# Patient Record
Sex: FEMALE | Race: BLACK OR AFRICAN AMERICAN | Employment: FULL TIME | URBAN - METROPOLITAN AREA
[De-identification: names, ages, dates, MRNs, and addresses within clinical notes are randomized per-mention and may not be internally consistent; named-entity substitution may affect disease eponyms.]

---

## 2017-12-22 ENCOUNTER — HOSPITAL ENCOUNTER (OUTPATIENT)
Dept: MAMMOGRAPHY | Age: 57
Discharge: HOME | End: 2017-12-22
Payer: COMMERCIAL

## 2017-12-22 ENCOUNTER — HOSPITAL ENCOUNTER (OUTPATIENT)
Age: 57
Discharge: HOME | End: 2017-12-22
Payer: COMMERCIAL

## 2017-12-22 DIAGNOSIS — Z13.820 SCREENING FOR OSTEOPOROSIS: ICD-10-CM

## 2017-12-22 DIAGNOSIS — Z12.31 ENCOUNTER FOR SCREENING MAMMOGRAM FOR BREAST CANCER: ICD-10-CM

## 2017-12-22 PROCEDURE — 77063 BREAST TOMOSYNTHESIS BI: CPT

## 2017-12-22 PROCEDURE — 77080 DXA BONE DENSITY AXIAL: CPT

## 2018-12-29 ENCOUNTER — HOSPITAL ENCOUNTER (OUTPATIENT)
Age: 58
Discharge: HOME | End: 2018-12-31
Payer: COMMERCIAL

## 2018-12-29 ENCOUNTER — HOSPITAL ENCOUNTER (OUTPATIENT)
Dept: MAMMOGRAPHY | Age: 58
Discharge: HOME | End: 2018-12-31
Payer: COMMERCIAL

## 2018-12-29 DIAGNOSIS — M81.0 OSTEOPOROSIS, UNSPECIFIED OSTEOPOROSIS TYPE, UNSPECIFIED PATHOLOGICAL FRACTURE PRESENCE: ICD-10-CM

## 2018-12-29 DIAGNOSIS — Z12.39 BREAST CANCER SCREENING: ICD-10-CM

## 2018-12-29 PROCEDURE — 77063 BREAST TOMOSYNTHESIS BI: CPT

## 2018-12-29 PROCEDURE — 77080 DXA BONE DENSITY AXIAL: CPT

## 2020-12-12 ENCOUNTER — HOSPITAL ENCOUNTER (OUTPATIENT)
Dept: MAMMOGRAPHY | Age: 60
Discharge: HOME | End: 2020-12-14
Payer: COMMERCIAL

## 2020-12-12 PROCEDURE — 77063 BREAST TOMOSYNTHESIS BI: CPT

## 2021-04-09 ENCOUNTER — HOSPITAL ENCOUNTER (OUTPATIENT)
Dept: PREADMISSION TESTING | Age: 61
Discharge: HOME | End: 2021-04-13
Payer: COMMERCIAL

## 2021-04-09 VITALS
SYSTOLIC BLOOD PRESSURE: 158 MMHG | OXYGEN SATURATION: 99 % | HEART RATE: 73 BPM | RESPIRATION RATE: 18 BRPM | WEIGHT: 180.2 LBS | DIASTOLIC BLOOD PRESSURE: 65 MMHG | BODY MASS INDEX: 35.38 KG/M2 | TEMPERATURE: 96 F | HEIGHT: 60 IN

## 2021-04-09 LAB
-: ABNORMAL
ABSOLUTE EOS #: <0.03 K/UL (ref 0–0.44)
ABSOLUTE IMMATURE GRANULOCYTE: 0.03 K/UL (ref 0–0.3)
ABSOLUTE LYMPH #: 1.3 K/UL (ref 1.1–3.7)
ABSOLUTE MONO #: 0.37 K/UL (ref 0.1–1.2)
AMORPHOUS: ABNORMAL
ANION GAP SERPL CALCULATED.3IONS-SCNC: 12 MMOL/L (ref 9–17)
BACTERIA: ABNORMAL
BASOPHILS # BLD: 0 % (ref 0–2)
BASOPHILS ABSOLUTE: 0.03 K/UL (ref 0–0.2)
BILIRUBIN URINE: NEGATIVE
BUN BLDV-MCNC: 19 MG/DL (ref 8–23)
BUN/CREAT BLD: 21 (ref 9–20)
CALCIUM SERPL-MCNC: 9.3 MG/DL (ref 8.6–10.4)
CASTS UA: ABNORMAL /LPF
CHLORIDE BLD-SCNC: 103 MMOL/L (ref 98–107)
CO2: 26 MMOL/L (ref 20–31)
COLOR: YELLOW
COMMENT UA: ABNORMAL
CREAT SERPL-MCNC: 0.91 MG/DL (ref 0.5–0.9)
CRYSTALS, UA: ABNORMAL /HPF
DIFFERENTIAL TYPE: ABNORMAL
EOSINOPHILS RELATIVE PERCENT: 0 % (ref 1–4)
EPITHELIAL CELLS UA: ABNORMAL /HPF (ref 0–5)
GFR AFRICAN AMERICAN: >60 ML/MIN
GFR NON-AFRICAN AMERICAN: >60 ML/MIN
GFR SERPL CREATININE-BSD FRML MDRD: ABNORMAL ML/MIN/{1.73_M2}
GFR SERPL CREATININE-BSD FRML MDRD: ABNORMAL ML/MIN/{1.73_M2}
GLUCOSE BLD-MCNC: 178 MG/DL (ref 70–99)
GLUCOSE URINE: ABNORMAL
HCT VFR BLD CALC: 38.1 % (ref 36.3–47.1)
HEMOGLOBIN: 12 G/DL (ref 11.9–15.1)
IMMATURE GRANULOCYTES: 0 %
INR BLD: 0.9
KETONES, URINE: NEGATIVE
LEUKOCYTE ESTERASE, URINE: ABNORMAL
LYMPHOCYTES # BLD: 17 % (ref 24–43)
MCH RBC QN AUTO: 30 PG (ref 25.2–33.5)
MCHC RBC AUTO-ENTMCNC: 31.5 G/DL (ref 28.4–34.8)
MCV RBC AUTO: 95.3 FL (ref 82.6–102.9)
MONOCYTES # BLD: 5 % (ref 3–12)
MUCUS: ABNORMAL
NITRITE, URINE: NEGATIVE
NRBC AUTOMATED: 0 PER 100 WBC
OTHER OBSERVATIONS UA: ABNORMAL
PARTIAL THROMBOPLASTIN TIME: 23.9 SEC (ref 23.9–33.8)
PDW BLD-RTO: 13.5 % (ref 11.8–14.4)
PH UA: 6 (ref 5–8)
PLATELET # BLD: 297 K/UL (ref 138–453)
PLATELET ESTIMATE: ABNORMAL
PMV BLD AUTO: 9.8 FL (ref 8.1–13.5)
POTASSIUM SERPL-SCNC: 4.3 MMOL/L (ref 3.7–5.3)
PROTEIN UA: ABNORMAL
PROTHROMBIN TIME: 11.9 SEC (ref 11.5–14.2)
RBC # BLD: 4 M/UL (ref 3.95–5.11)
RBC # BLD: ABNORMAL 10*6/UL
RBC UA: ABNORMAL /HPF (ref 0–2)
RENAL EPITHELIAL, UA: ABNORMAL /HPF
SEG NEUTROPHILS: 78 % (ref 36–65)
SEGMENTED NEUTROPHILS ABSOLUTE COUNT: 5.9 K/UL (ref 1.5–8.1)
SODIUM BLD-SCNC: 141 MMOL/L (ref 135–144)
SPECIFIC GRAVITY UA: 1.04 (ref 1–1.03)
TRICHOMONAS: ABNORMAL
TURBIDITY: ABNORMAL
URINE HGB: ABNORMAL
UROBILINOGEN, URINE: NORMAL
WBC # BLD: 7.6 K/UL (ref 3.5–11.3)
WBC # BLD: ABNORMAL 10*3/UL
WBC UA: ABNORMAL /HPF (ref 0–5)
YEAST: ABNORMAL

## 2021-04-09 PROCEDURE — 87641 MR-STAPH DNA AMP PROBE: CPT

## 2021-04-09 PROCEDURE — 85610 PROTHROMBIN TIME: CPT

## 2021-04-09 PROCEDURE — 93005 ELECTROCARDIOGRAM TRACING: CPT | Performed by: ORTHOPAEDIC SURGERY

## 2021-04-09 PROCEDURE — 81001 URINALYSIS AUTO W/SCOPE: CPT

## 2021-04-09 PROCEDURE — 85025 COMPLETE CBC W/AUTO DIFF WBC: CPT

## 2021-04-09 PROCEDURE — 80048 BASIC METABOLIC PNL TOTAL CA: CPT

## 2021-04-09 PROCEDURE — 85730 THROMBOPLASTIN TIME PARTIAL: CPT

## 2021-04-09 PROCEDURE — 36415 COLL VENOUS BLD VENIPUNCTURE: CPT

## 2021-04-09 RX ORDER — CLINDAMYCIN PHOSPHATE 900 MG/50ML
900 INJECTION INTRAVENOUS ONCE
Status: CANCELLED | OUTPATIENT
Start: 2021-04-26

## 2021-04-09 RX ORDER — CARVEDILOL 3.12 MG/1
TABLET ORAL
COMMUNITY

## 2021-04-09 RX ORDER — OXYCODONE HYDROCHLORIDE AND ACETAMINOPHEN 5; 325 MG/1; MG/1
2 TABLET ORAL EVERY 6 HOURS PRN
Status: ON HOLD | COMMUNITY
End: 2021-04-27 | Stop reason: HOSPADM

## 2021-04-09 RX ORDER — NYSTATIN 100000 [USP'U]/G
POWDER TOPICAL 4 TIMES DAILY
COMMUNITY
Start: 2021-03-24

## 2021-04-09 RX ORDER — TIZANIDINE HYDROCHLORIDE 2 MG/1
CAPSULE, GELATIN COATED ORAL
Status: ON HOLD | COMMUNITY
End: 2021-04-27 | Stop reason: HOSPADM

## 2021-04-09 RX ORDER — MULTIVIT WITH MINERALS/LUTEIN
250 TABLET ORAL DAILY
COMMUNITY

## 2021-04-09 RX ORDER — PREDNISONE 5 MG/1
TABLET ORAL
Status: ON HOLD | COMMUNITY
End: 2021-04-27 | Stop reason: HOSPADM

## 2021-04-09 RX ORDER — LOSARTAN POTASSIUM 50 MG/1
TABLET ORAL
COMMUNITY

## 2021-04-09 RX ORDER — IBUPROFEN 600 MG/1
600 TABLET ORAL EVERY 6 HOURS PRN
Status: ON HOLD | COMMUNITY
End: 2021-04-27 | Stop reason: HOSPADM

## 2021-04-09 RX ORDER — CALCIUM CARBONATE 500(1250)
500 TABLET ORAL DAILY
COMMUNITY

## 2021-04-09 RX ORDER — INSULIN GLARGINE 100 [IU]/ML
25 INJECTION, SOLUTION SUBCUTANEOUS
COMMUNITY

## 2021-04-09 RX ORDER — INSULIN LISPRO 100 [IU]/ML
6 INJECTION, SOLUTION INTRAVENOUS; SUBCUTANEOUS PRN
COMMUNITY

## 2021-04-09 RX ORDER — GABAPENTIN 100 MG/1
CAPSULE ORAL
COMMUNITY

## 2021-04-09 RX ORDER — FUROSEMIDE 40 MG/1
40 TABLET ORAL 2 TIMES DAILY
COMMUNITY

## 2021-04-09 RX ORDER — ZINC GLUCONATE 50 MG
50 TABLET ORAL DAILY
COMMUNITY

## 2021-04-09 RX ORDER — MAGNESIUM OXIDE 400 MG/1
400 TABLET ORAL DAILY
COMMUNITY

## 2021-04-09 RX ORDER — VIT C/B6/B5/MAGNESIUM/HERB 173 50-5-6-5MG
CAPSULE ORAL
Status: ON HOLD | COMMUNITY
End: 2021-04-27 | Stop reason: HOSPADM

## 2021-04-09 RX ORDER — M-VIT,TX,IRON,MINS/CALC/FOLIC 27MG-0.4MG
1 TABLET ORAL DAILY
COMMUNITY

## 2021-04-09 RX ORDER — CHOLECALCIFEROL (VITAMIN D3) 100000/G
POWDER (GRAM) MISCELLANEOUS
COMMUNITY

## 2021-04-09 ASSESSMENT — PAIN DESCRIPTION - ORIENTATION: ORIENTATION: RIGHT;LEFT

## 2021-04-09 NOTE — PRE-PROCEDURE INSTRUCTIONS
[facility] ON Monday, April 26 at 1130 AM    Once you enter the hospital lobby, take the elevators to the second floor. Check-In is at the surgery registration desk. Continue to take your home medications as you normally do up to and including the night before surgery with the exception of any blood thinning medications. Please stop any blood thinning medications as directed by your surgeon or prescribing physician. Failure to stop certain medications may interfere with your scheduled surgery. These may include:  Aspirin, Warfarin (Coumadin), Clopidogrel (Plavix), Ibuprofen (Motrin, Advil), Naproxen (Aleve), Meloxicam (Mobic), Celecoxib (Celebrex), Eliquis, Pradaxa, Xarelto, Effient, Fish Oil, Herbal supplements. Stop taking supplements and advil 5-7 days before surgery     If you are diabetic, do not take any of your diabetic medications by mouth the morning of surgery. If you are taking insulin contact the doctor that manages your diabetes for instructions about any changes to your insulin dosages the day before surgery. Do not inject insulin or other injectable diabetic medications the morning of surgery unless otherwise instructed by the doctor who manages your diabetes. Please take the following medication(s) the day of surgery with a small sip of water:  Carvedilol,       PREPARING FOR YOUR SURGERY:     Before surgery, you can play an important role in your own health. Because skin is not sterile, we need to be sure that your skin is as free of germs as possible before surgery by carefully washing before surgery. Preparing or prepping skin before surgery can reduce the risk of a surgical site infection.   Do not shave the area of your body where your surgery will be performed unless you received specific permission from your physician.     You will need to shower at home the night before surgery and the morning of surgery with a special soap called chlorhexidine gluconate (CHG*). *Not to be used by people allergic to Chlorhexidine Gluconate (CHG). Following these instructions will help you be sure that your skin is clean before surgery. Instructions on cleaning your skin before surgery: The night before your surgery:      You will need to shower with warm water (not hot) and the CHG soap.  Use a clean wash cloth and a clean towel. Have clean clothes available to put on after the shower.   First wash your hair with regular shampoo. Rinse your hair and body thoroughly to remove the shampoo.  Wash your face and genital area (private parts) with your regular soap or water only. Thoroughly rinse your body with warm water from the neck down.  Turn water off to prevent rinsing the soap off too soon.  With a clean wet washcloth and half of the CHG soap in the bottle, lather your entire body from the neck down. Do not use CHG soap near your eyes or ears to avoid injury to those areas.  Wash thoroughly, paying special attention to the area where your surgery will be performed.  Wash your body gently for five (5) minutes. Avoid scrubbing your skin too hard.  Turn the water back on and rinse your body thoroughly.  Pat yourself dry with a clean, soft towel. Do not apply lotion, cream or powder.  Dress with clean freshly washed clothes. The morning of surgery:     Repeat shower following steps above - using remaining half of CHG soap in bottle. Patient Instructions:    Angelia Eaton If you are having any type of anesthesia you are to have nothing to eat or drink after midnight the night before your surgery. This includes gum, hard candy, mints, water or smoking or chewing tobacco.  The only exception to this is a small sip of water to take with any morning dose of heart, blood pressure, or seizure medications. No alcoholic beverages for 24 hours prior to surgery.  Brush your teeth but do not swallow water.      Bring your eye glasses and case with you. No contacts are to be worn the day of surgery. You also may bring your hearing aids. Most surgical procedures involving anesthesia will require that you remove your dentures prior to surgery.  If you are on C-PAP or Bi-PAP at home and plan on staying in the hospital overnight for your surgery please bring the machine with you. · Do not wear any jewelry or body piercings day of surgery. Also, NO lotion, perfume or deodorant to be used the day of surgery. No nail polish on the operative extremity (arm/leg surgeries)    · If you are staying overnight with us, please bring a small bag of necessary personal items.  Please wear loose, comfortable clothing. If you are potentially going to have a cast or brace bring clothing that will fit over them.  In case of illness - If you have cold or flu like symptoms (high fever, runny nose, sore throat, cough, etc.) rash, nausea, vomiting, loose stools, and/or recent contact with someone who has a contagious disease (chicken pox, measles, etc.) Please call your doctor before coming to the hospital.         Day of Surgery/Procedure:    As a patient at [facility] - INPATIENT you can expect quality medical and nursing care that is centered on your individual needs. Our goal is to make your surgical experience as comfortable as possible    . Transportation After Your Surgery/Procedure: You will need a friend or family member to drive you home after your procedure. Your  must be 25years of age or older and able to sign off on your discharge instructions. A taxi cab or any other form of public transportation is not acceptable. Your friend or family member must stay at the hospital throughout your procedure.     Someone must remain with you for the first 24 hours after your surgery if you receive anesthesia or medication. If you do not have someone to stay with you, your procedure may be cancelled.       If you have any other questions regarding your procedure or the day of surgery, please call [phone]      _________________________  ____________________________  Signature (Patient)              Signature (Provider)               Date

## 2021-04-10 LAB
MRSA, DNA, NASAL: NORMAL
SPECIMEN DESCRIPTION: NORMAL

## 2021-04-11 LAB
EKG ATRIAL RATE: 73 BPM
EKG P AXIS: 122 DEGREES
EKG P-R INTERVAL: 136 MS
EKG Q-T INTERVAL: 364 MS
EKG QRS DURATION: 82 MS
EKG QTC CALCULATION (BAZETT): 401 MS
EKG R AXIS: 49 DEGREES
EKG T AXIS: 49 DEGREES
EKG VENTRICULAR RATE: 73 BPM

## 2021-04-11 PROCEDURE — 93010 ELECTROCARDIOGRAM REPORT: CPT | Performed by: INTERNAL MEDICINE

## 2021-04-13 ENCOUNTER — HOSPITAL ENCOUNTER (OUTPATIENT)
Age: 61
Setting detail: SPECIMEN
Discharge: HOME | End: 2021-04-13
Payer: COMMERCIAL

## 2021-04-13 LAB
-: ABNORMAL
AMORPHOUS: ABNORMAL
BACTERIA: ABNORMAL
BILIRUBIN URINE: NEGATIVE
CASTS UA: ABNORMAL /LPF (ref 0–2)
COLOR: YELLOW
COMMENT UA: ABNORMAL
CRYSTALS, UA: ABNORMAL /HPF
EPITHELIAL CELLS UA: ABNORMAL /HPF (ref 0–5)
GLUCOSE URINE: ABNORMAL
KETONES, URINE: ABNORMAL
LEUKOCYTE ESTERASE, URINE: NEGATIVE
MUCUS: ABNORMAL
NITRITE, URINE: NEGATIVE
OTHER OBSERVATIONS UA: ABNORMAL
PH UA: 5 (ref 5–8)
PROTEIN UA: ABNORMAL
RBC UA: ABNORMAL /HPF (ref 0–2)
RENAL EPITHELIAL, UA: ABNORMAL /HPF
SPECIFIC GRAVITY UA: 1.03 (ref 1–1.03)
TRICHOMONAS: ABNORMAL
TURBIDITY: CLEAR
URINE HGB: ABNORMAL
UROBILINOGEN, URINE: NORMAL
WBC UA: ABNORMAL /HPF (ref 0–5)
YEAST: ABNORMAL

## 2021-04-13 PROCEDURE — 36415 COLL VENOUS BLD VENIPUNCTURE: CPT

## 2021-04-13 PROCEDURE — 81001 URINALYSIS AUTO W/SCOPE: CPT

## 2021-04-13 PROCEDURE — 87086 URINE CULTURE/COLONY COUNT: CPT

## 2021-04-14 LAB
CULTURE: NORMAL
Lab: NORMAL
SPECIMEN DESCRIPTION: NORMAL

## 2021-04-22 ENCOUNTER — HOSPITAL ENCOUNTER (OUTPATIENT)
Dept: LAB | Age: 61
Setting detail: SPECIMEN
Discharge: HOME | End: 2021-04-22
Payer: COMMERCIAL

## 2021-04-22 DIAGNOSIS — Z01.818 PREOP TESTING: Primary | ICD-10-CM

## 2021-04-22 PROCEDURE — U0003 INFECTIOUS AGENT DETECTION BY NUCLEIC ACID (DNA OR RNA); SEVERE ACUTE RESPIRATORY SYNDROME CORONAVIRUS 2 (SARS-COV-2) (CORONAVIRUS DISEASE [COVID-19]), AMPLIFIED PROBE TECHNIQUE, MAKING USE OF HIGH THROUGHPUT TECHNOLOGIES AS DESCRIBED BY CMS-2020-01-R: HCPCS

## 2021-04-22 PROCEDURE — U0005 INFEC AGEN DETEC AMPLI PROBE: HCPCS

## 2021-04-23 LAB
SARS-COV-2: NORMAL
SARS-COV-2: NOT DETECTED
SOURCE: NORMAL

## 2021-04-26 ENCOUNTER — ANESTHESIA (OUTPATIENT)
Dept: OPERATING ROOM | Age: 61
DRG: 454 | End: 2021-04-26
Payer: COMMERCIAL

## 2021-04-26 ENCOUNTER — APPOINTMENT (OUTPATIENT)
Dept: GENERAL RADIOLOGY | Age: 61
DRG: 454 | End: 2021-04-26
Attending: ORTHOPAEDIC SURGERY
Payer: COMMERCIAL

## 2021-04-26 ENCOUNTER — HOSPITAL ENCOUNTER (INPATIENT)
Age: 61
LOS: 2 days | Discharge: NURSING, REHAB OR LONG-TERM CARE | DRG: 454 | End: 2021-04-28
Attending: ORTHOPAEDIC SURGERY | Admitting: ORTHOPAEDIC SURGERY
Payer: COMMERCIAL

## 2021-04-26 ENCOUNTER — ANESTHESIA EVENT (OUTPATIENT)
Dept: OPERATING ROOM | Age: 61
DRG: 454 | End: 2021-04-26
Payer: COMMERCIAL

## 2021-04-26 VITALS — TEMPERATURE: 96.8 F | SYSTOLIC BLOOD PRESSURE: 85 MMHG | OXYGEN SATURATION: 100 % | DIASTOLIC BLOOD PRESSURE: 51 MMHG

## 2021-04-26 DIAGNOSIS — M54.42 CHRONIC BILATERAL LOW BACK PAIN WITH LEFT-SIDED SCIATICA: ICD-10-CM

## 2021-04-26 DIAGNOSIS — M43.10 SPONDYLOLISTHESIS, ACQUIRED: Primary | Chronic | ICD-10-CM

## 2021-04-26 DIAGNOSIS — G89.29 CHRONIC BILATERAL LOW BACK PAIN WITH LEFT-SIDED SCIATICA: ICD-10-CM

## 2021-04-26 PROBLEM — M48.062 SPINAL STENOSIS, LUMBAR REGION WITH NEUROGENIC CLAUDICATION: Chronic | Status: ACTIVE | Noted: 2021-04-26

## 2021-04-26 LAB
GLUCOSE BLD-MCNC: 169 MG/DL (ref 65–105)
GLUCOSE BLD-MCNC: 188 MG/DL (ref 65–105)
GLUCOSE BLD-MCNC: 194 MG/DL (ref 65–105)
GLUCOSE BLD-MCNC: 196 MG/DL (ref 65–105)
GLUCOSE BLD-MCNC: 242 MG/DL (ref 65–105)

## 2021-04-26 PROCEDURE — 01NB0ZZ RELEASE LUMBAR NERVE, OPEN APPROACH: ICD-10-PCS | Performed by: ORTHOPAEDIC SURGERY

## 2021-04-26 PROCEDURE — 2500000003 HC RX 250 WO HCPCS: Performed by: ORTHOPAEDIC SURGERY

## 2021-04-26 PROCEDURE — 6370000000 HC RX 637 (ALT 250 FOR IP): Performed by: ORTHOPAEDIC SURGERY

## 2021-04-26 PROCEDURE — 6370000000 HC RX 637 (ALT 250 FOR IP): Performed by: NURSE ANESTHETIST, CERTIFIED REGISTERED

## 2021-04-26 PROCEDURE — 2780000010 HC IMPLANT OTHER: Performed by: ORTHOPAEDIC SURGERY

## 2021-04-26 PROCEDURE — 3600000014 HC SURGERY LEVEL 4 ADDTL 15MIN: Performed by: ORTHOPAEDIC SURGERY

## 2021-04-26 PROCEDURE — 3600000004 HC SURGERY LEVEL 4 BASE: Performed by: ORTHOPAEDIC SURGERY

## 2021-04-26 PROCEDURE — 7100000001 HC PACU RECOVERY - ADDTL 15 MIN: Performed by: ORTHOPAEDIC SURGERY

## 2021-04-26 PROCEDURE — 2580000003 HC RX 258: Performed by: ORTHOPAEDIC SURGERY

## 2021-04-26 PROCEDURE — 2709999900 HC NON-CHARGEABLE SUPPLY: Performed by: ORTHOPAEDIC SURGERY

## 2021-04-26 PROCEDURE — C1713 ANCHOR/SCREW BN/BN,TIS/BN: HCPCS | Performed by: ORTHOPAEDIC SURGERY

## 2021-04-26 PROCEDURE — 0SB20ZZ EXCISION OF LUMBAR VERTEBRAL DISC, OPEN APPROACH: ICD-10-PCS | Performed by: ORTHOPAEDIC SURGERY

## 2021-04-26 PROCEDURE — 0SG0071 FUSION OF LUMBAR VERTEBRAL JOINT WITH AUTOLOGOUS TISSUE SUBSTITUTE, POSTERIOR APPROACH, POSTERIOR COLUMN, OPEN APPROACH: ICD-10-PCS | Performed by: ORTHOPAEDIC SURGERY

## 2021-04-26 PROCEDURE — 6370000000 HC RX 637 (ALT 250 FOR IP): Performed by: ANESTHESIOLOGY

## 2021-04-26 PROCEDURE — 6360000002 HC RX W HCPCS: Performed by: ORTHOPAEDIC SURGERY

## 2021-04-26 PROCEDURE — 3209999900 FLUORO FOR SURGICAL PROCEDURES

## 2021-04-26 PROCEDURE — 3700000001 HC ADD 15 MINUTES (ANESTHESIA): Performed by: ORTHOPAEDIC SURGERY

## 2021-04-26 PROCEDURE — 6360000002 HC RX W HCPCS: Performed by: ANESTHESIOLOGY

## 2021-04-26 PROCEDURE — 2580000003 HC RX 258: Performed by: ANESTHESIOLOGY

## 2021-04-26 PROCEDURE — 0SG00AJ FUSION OF LUMBAR VERTEBRAL JOINT WITH INTERBODY FUSION DEVICE, POSTERIOR APPROACH, ANTERIOR COLUMN, OPEN APPROACH: ICD-10-PCS | Performed by: ORTHOPAEDIC SURGERY

## 2021-04-26 PROCEDURE — 2500000003 HC RX 250 WO HCPCS: Performed by: NURSE ANESTHETIST, CERTIFIED REGISTERED

## 2021-04-26 PROCEDURE — 2720000010 HC SURG SUPPLY STERILE: Performed by: ORTHOPAEDIC SURGERY

## 2021-04-26 PROCEDURE — 7100000000 HC PACU RECOVERY - FIRST 15 MIN: Performed by: ORTHOPAEDIC SURGERY

## 2021-04-26 PROCEDURE — 1200000000 HC SEMI PRIVATE

## 2021-04-26 PROCEDURE — 6360000002 HC RX W HCPCS: Performed by: NURSE ANESTHETIST, CERTIFIED REGISTERED

## 2021-04-26 PROCEDURE — 82947 ASSAY GLUCOSE BLOOD QUANT: CPT

## 2021-04-26 PROCEDURE — 3700000000 HC ANESTHESIA ATTENDED CARE: Performed by: ORTHOPAEDIC SURGERY

## 2021-04-26 DEVICE — GRAFT BNE SUB 5ML MTRX CELLULAR OSTEOCEL +: Type: IMPLANTABLE DEVICE | Site: BACK | Status: FUNCTIONAL

## 2021-04-26 DEVICE — SCREW SPNL L45MM DIA6.5MM POST THORACOLUMBOSACRAL MOD SHANK: Type: IMPLANTABLE DEVICE | Site: BACK | Status: FUNCTIONAL

## 2021-04-26 DEVICE — SCREW SPNL DIA5.5MM OPN TULIP LOK RELINE: Type: IMPLANTABLE DEVICE | Site: BACK | Status: FUNCTIONAL

## 2021-04-26 DEVICE — SCREW SPNL MOD TULIP RELINE: Type: IMPLANTABLE DEVICE | Site: BACK | Status: FUNCTIONAL

## 2021-04-26 DEVICE — ROD SPNL L45MM DIA5.5MM POST THORACOLUMBOSACRAL TI LORD: Type: IMPLANTABLE DEVICE | Site: BACK | Status: FUNCTIONAL

## 2021-04-26 DEVICE — TIM-TISSUE CANCELLOUS 30.0CC CRUSHED: Type: IMPLANTABLE DEVICE | Site: BACK | Status: FUNCTIONAL

## 2021-04-26 DEVICE — SCREW SPNL L45MM DIA6.5MM POLYAX 2C RELINE MAS: Type: IMPLANTABLE DEVICE | Site: BACK | Status: FUNCTIONAL

## 2021-04-26 DEVICE — CAGE SPNL 4 W12XH10XL30MM OBLQ TRANSFORAMINAL LUM INTBDY: Type: IMPLANTABLE DEVICE | Site: BACK | Status: FUNCTIONAL

## 2021-04-26 RX ORDER — POLYETHYLENE GLYCOL 3350 17 G/17G
17 POWDER, FOR SOLUTION ORAL DAILY
Status: DISCONTINUED | OUTPATIENT
Start: 2021-04-26 | End: 2021-04-28 | Stop reason: HOSPADM

## 2021-04-26 RX ORDER — HYDROMORPHONE HYDROCHLORIDE 1 MG/ML
0.5 INJECTION, SOLUTION INTRAMUSCULAR; INTRAVENOUS; SUBCUTANEOUS EVERY 5 MIN PRN
Status: DISCONTINUED | OUTPATIENT
Start: 2021-04-26 | End: 2021-04-26 | Stop reason: HOSPADM

## 2021-04-26 RX ORDER — MORPHINE SULFATE 2 MG/ML
2 INJECTION, SOLUTION INTRAMUSCULAR; INTRAVENOUS
Status: DISCONTINUED | OUTPATIENT
Start: 2021-04-26 | End: 2021-04-28 | Stop reason: HOSPADM

## 2021-04-26 RX ORDER — SODIUM CHLORIDE 0.9 % (FLUSH) 0.9 %
10 SYRINGE (ML) INJECTION PRN
Status: DISCONTINUED | OUTPATIENT
Start: 2021-04-26 | End: 2021-04-28 | Stop reason: HOSPADM

## 2021-04-26 RX ORDER — ACETAMINOPHEN 500 MG
1000 TABLET ORAL ONCE
Status: COMPLETED | OUTPATIENT
Start: 2021-04-26 | End: 2021-04-26

## 2021-04-26 RX ORDER — DEXAMETHASONE SODIUM PHOSPHATE 10 MG/ML
INJECTION, SOLUTION INTRAMUSCULAR; INTRAVENOUS PRN
Status: DISCONTINUED | OUTPATIENT
Start: 2021-04-26 | End: 2021-04-26 | Stop reason: SDUPTHER

## 2021-04-26 RX ORDER — FUROSEMIDE 40 MG/1
40 TABLET ORAL 2 TIMES DAILY
Status: DISCONTINUED | OUTPATIENT
Start: 2021-04-26 | End: 2021-04-27

## 2021-04-26 RX ORDER — ONDANSETRON 2 MG/ML
INJECTION INTRAMUSCULAR; INTRAVENOUS PRN
Status: DISCONTINUED | OUTPATIENT
Start: 2021-04-26 | End: 2021-04-26 | Stop reason: SDUPTHER

## 2021-04-26 RX ORDER — HYDRALAZINE HYDROCHLORIDE 20 MG/ML
5 INJECTION INTRAMUSCULAR; INTRAVENOUS EVERY 10 MIN PRN
Status: DISCONTINUED | OUTPATIENT
Start: 2021-04-26 | End: 2021-04-26 | Stop reason: HOSPADM

## 2021-04-26 RX ORDER — SENNA AND DOCUSATE SODIUM 50; 8.6 MG/1; MG/1
1 TABLET, FILM COATED ORAL 2 TIMES DAILY
Status: DISCONTINUED | OUTPATIENT
Start: 2021-04-26 | End: 2021-04-28 | Stop reason: HOSPADM

## 2021-04-26 RX ORDER — OXYCODONE HYDROCHLORIDE AND ACETAMINOPHEN 5; 325 MG/1; MG/1
1 TABLET ORAL PRN
Status: DISCONTINUED | OUTPATIENT
Start: 2021-04-26 | End: 2021-04-26 | Stop reason: HOSPADM

## 2021-04-26 RX ORDER — MORPHINE SULFATE 4 MG/ML
4 INJECTION, SOLUTION INTRAMUSCULAR; INTRAVENOUS
Status: DISCONTINUED | OUTPATIENT
Start: 2021-04-26 | End: 2021-04-28 | Stop reason: HOSPADM

## 2021-04-26 RX ORDER — SODIUM CHLORIDE 0.9 % (FLUSH) 0.9 %
10 SYRINGE (ML) INJECTION EVERY 12 HOURS SCHEDULED
Status: DISCONTINUED | OUTPATIENT
Start: 2021-04-26 | End: 2021-04-26 | Stop reason: HOSPADM

## 2021-04-26 RX ORDER — DEXTROSE MONOHYDRATE 25 G/50ML
12.5 INJECTION, SOLUTION INTRAVENOUS PRN
Status: DISCONTINUED | OUTPATIENT
Start: 2021-04-26 | End: 2021-04-28 | Stop reason: HOSPADM

## 2021-04-26 RX ORDER — SODIUM CHLORIDE 0.9 % (FLUSH) 0.9 %
10 SYRINGE (ML) INJECTION PRN
Status: DISCONTINUED | OUTPATIENT
Start: 2021-04-26 | End: 2021-04-26 | Stop reason: HOSPADM

## 2021-04-26 RX ORDER — SODIUM CHLORIDE 0.9 % (FLUSH) 0.9 %
10 SYRINGE (ML) INJECTION EVERY 12 HOURS SCHEDULED
Status: DISCONTINUED | OUTPATIENT
Start: 2021-04-26 | End: 2021-04-28 | Stop reason: HOSPADM

## 2021-04-26 RX ORDER — MORPHINE SULFATE 15 MG/1
15 TABLET, FILM COATED, EXTENDED RELEASE ORAL EVERY 12 HOURS SCHEDULED
Status: DISCONTINUED | OUTPATIENT
Start: 2021-04-26 | End: 2021-04-28 | Stop reason: HOSPADM

## 2021-04-26 RX ORDER — INSULIN GLARGINE 100 [IU]/ML
25 INJECTION, SOLUTION SUBCUTANEOUS
Status: DISCONTINUED | OUTPATIENT
Start: 2021-04-27 | End: 2021-04-28 | Stop reason: HOSPADM

## 2021-04-26 RX ORDER — PHENYLEPHRINE HCL IN 0.9% NACL 1 MG/10 ML
SYRINGE (ML) INTRAVENOUS PRN
Status: DISCONTINUED | OUTPATIENT
Start: 2021-04-26 | End: 2021-04-26 | Stop reason: SDUPTHER

## 2021-04-26 RX ORDER — LOSARTAN POTASSIUM 50 MG/1
50 TABLET ORAL DAILY
Status: DISCONTINUED | OUTPATIENT
Start: 2021-04-26 | End: 2021-04-28 | Stop reason: HOSPADM

## 2021-04-26 RX ORDER — FENTANYL CITRATE 50 UG/ML
INJECTION, SOLUTION INTRAMUSCULAR; INTRAVENOUS PRN
Status: DISCONTINUED | OUTPATIENT
Start: 2021-04-26 | End: 2021-04-26 | Stop reason: SDUPTHER

## 2021-04-26 RX ORDER — CLINDAMYCIN PHOSPHATE 900 MG/50ML
900 INJECTION INTRAVENOUS ONCE
Status: COMPLETED | OUTPATIENT
Start: 2021-04-26 | End: 2021-04-26

## 2021-04-26 RX ORDER — LIDOCAINE HYDROCHLORIDE 10 MG/ML
1 INJECTION, SOLUTION EPIDURAL; INFILTRATION; INTRACAUDAL; PERINEURAL
Status: DISCONTINUED | OUTPATIENT
Start: 2021-04-26 | End: 2021-04-26 | Stop reason: HOSPADM

## 2021-04-26 RX ORDER — SODIUM CHLORIDE 9 MG/ML
25 INJECTION, SOLUTION INTRAVENOUS PRN
Status: DISCONTINUED | OUTPATIENT
Start: 2021-04-26 | End: 2021-04-28 | Stop reason: HOSPADM

## 2021-04-26 RX ORDER — ALBUTEROL SULFATE 90 UG/1
AEROSOL, METERED RESPIRATORY (INHALATION) PRN
Status: DISCONTINUED | OUTPATIENT
Start: 2021-04-26 | End: 2021-04-26 | Stop reason: SDUPTHER

## 2021-04-26 RX ORDER — SODIUM CHLORIDE 9 MG/ML
25 INJECTION, SOLUTION INTRAVENOUS PRN
Status: DISCONTINUED | OUTPATIENT
Start: 2021-04-26 | End: 2021-04-26 | Stop reason: HOSPADM

## 2021-04-26 RX ORDER — FENTANYL CITRATE 50 UG/ML
25 INJECTION, SOLUTION INTRAMUSCULAR; INTRAVENOUS EVERY 5 MIN PRN
Status: DISCONTINUED | OUTPATIENT
Start: 2021-04-26 | End: 2021-04-26 | Stop reason: HOSPADM

## 2021-04-26 RX ORDER — DEXTROSE MONOHYDRATE 50 MG/ML
100 INJECTION, SOLUTION INTRAVENOUS PRN
Status: DISCONTINUED | OUTPATIENT
Start: 2021-04-26 | End: 2021-04-28 | Stop reason: HOSPADM

## 2021-04-26 RX ORDER — SODIUM CHLORIDE 9 MG/ML
INJECTION, SOLUTION INTRAVENOUS CONTINUOUS
Status: DISCONTINUED | OUTPATIENT
Start: 2021-04-26 | End: 2021-04-28 | Stop reason: HOSPADM

## 2021-04-26 RX ORDER — NICOTINE POLACRILEX 4 MG
15 LOZENGE BUCCAL PRN
Status: DISCONTINUED | OUTPATIENT
Start: 2021-04-26 | End: 2021-04-28 | Stop reason: HOSPADM

## 2021-04-26 RX ORDER — PROMETHAZINE HYDROCHLORIDE 25 MG/ML
6.25 INJECTION, SOLUTION INTRAMUSCULAR; INTRAVENOUS
Status: DISCONTINUED | OUTPATIENT
Start: 2021-04-26 | End: 2021-04-26 | Stop reason: HOSPADM

## 2021-04-26 RX ORDER — OXYCODONE HYDROCHLORIDE AND ACETAMINOPHEN 5; 325 MG/1; MG/1
2 TABLET ORAL EVERY 4 HOURS PRN
Status: DISCONTINUED | OUTPATIENT
Start: 2021-04-26 | End: 2021-04-28 | Stop reason: HOSPADM

## 2021-04-26 RX ORDER — MIDAZOLAM HYDROCHLORIDE 1 MG/ML
INJECTION INTRAMUSCULAR; INTRAVENOUS PRN
Status: DISCONTINUED | OUTPATIENT
Start: 2021-04-26 | End: 2021-04-26 | Stop reason: SDUPTHER

## 2021-04-26 RX ORDER — LIDOCAINE HYDROCHLORIDE 20 MG/ML
INJECTION, SOLUTION EPIDURAL; INFILTRATION; INTRACAUDAL; PERINEURAL PRN
Status: DISCONTINUED | OUTPATIENT
Start: 2021-04-26 | End: 2021-04-26 | Stop reason: SDUPTHER

## 2021-04-26 RX ORDER — SODIUM CHLORIDE, SODIUM LACTATE, POTASSIUM CHLORIDE, CALCIUM CHLORIDE 600; 310; 30; 20 MG/100ML; MG/100ML; MG/100ML; MG/100ML
INJECTION, SOLUTION INTRAVENOUS CONTINUOUS
Status: DISCONTINUED | OUTPATIENT
Start: 2021-04-26 | End: 2021-04-26

## 2021-04-26 RX ORDER — BUPIVACAINE HYDROCHLORIDE AND EPINEPHRINE 5; 5 MG/ML; UG/ML
INJECTION, SOLUTION EPIDURAL; INTRACAUDAL; PERINEURAL PRN
Status: DISCONTINUED | OUTPATIENT
Start: 2021-04-26 | End: 2021-04-26 | Stop reason: HOSPADM

## 2021-04-26 RX ORDER — KETAMINE HCL IN NACL, ISO-OSM 100MG/10ML
SYRINGE (ML) INJECTION PRN
Status: DISCONTINUED | OUTPATIENT
Start: 2021-04-26 | End: 2021-04-26 | Stop reason: SDUPTHER

## 2021-04-26 RX ORDER — SODIUM CHLORIDE 9 MG/ML
INJECTION, SOLUTION INTRAVENOUS CONTINUOUS
Status: DISCONTINUED | OUTPATIENT
Start: 2021-04-26 | End: 2021-04-26

## 2021-04-26 RX ORDER — LABETALOL HYDROCHLORIDE 5 MG/ML
5 INJECTION, SOLUTION INTRAVENOUS EVERY 10 MIN PRN
Status: DISCONTINUED | OUTPATIENT
Start: 2021-04-26 | End: 2021-04-26 | Stop reason: HOSPADM

## 2021-04-26 RX ORDER — ONDANSETRON 2 MG/ML
4 INJECTION INTRAMUSCULAR; INTRAVENOUS
Status: DISCONTINUED | OUTPATIENT
Start: 2021-04-26 | End: 2021-04-26 | Stop reason: HOSPADM

## 2021-04-26 RX ORDER — CARVEDILOL 3.12 MG/1
3.12 TABLET ORAL 2 TIMES DAILY WITH MEALS
Status: DISCONTINUED | OUTPATIENT
Start: 2021-04-26 | End: 2021-04-28 | Stop reason: HOSPADM

## 2021-04-26 RX ORDER — GABAPENTIN 100 MG/1
100 CAPSULE ORAL 3 TIMES DAILY
Status: DISCONTINUED | OUTPATIENT
Start: 2021-04-26 | End: 2021-04-28 | Stop reason: HOSPADM

## 2021-04-26 RX ORDER — SUCCINYLCHOLINE CHLORIDE 20 MG/ML
INJECTION INTRAMUSCULAR; INTRAVENOUS PRN
Status: DISCONTINUED | OUTPATIENT
Start: 2021-04-26 | End: 2021-04-26 | Stop reason: SDUPTHER

## 2021-04-26 RX ORDER — CLINDAMYCIN PHOSPHATE 900 MG/50ML
900 INJECTION INTRAVENOUS EVERY 8 HOURS
Status: COMPLETED | OUTPATIENT
Start: 2021-04-26 | End: 2021-04-27

## 2021-04-26 RX ORDER — TIZANIDINE 4 MG/1
4 TABLET ORAL EVERY 8 HOURS PRN
Status: DISCONTINUED | OUTPATIENT
Start: 2021-04-26 | End: 2021-04-28 | Stop reason: HOSPADM

## 2021-04-26 RX ORDER — OXYCODONE HYDROCHLORIDE AND ACETAMINOPHEN 5; 325 MG/1; MG/1
2 TABLET ORAL PRN
Status: DISCONTINUED | OUTPATIENT
Start: 2021-04-26 | End: 2021-04-26 | Stop reason: HOSPADM

## 2021-04-26 RX ORDER — PROPOFOL 10 MG/ML
INJECTION, EMULSION INTRAVENOUS PRN
Status: DISCONTINUED | OUTPATIENT
Start: 2021-04-26 | End: 2021-04-26 | Stop reason: SDUPTHER

## 2021-04-26 RX ORDER — OXYCODONE HYDROCHLORIDE AND ACETAMINOPHEN 5; 325 MG/1; MG/1
1 TABLET ORAL EVERY 4 HOURS PRN
Status: DISCONTINUED | OUTPATIENT
Start: 2021-04-26 | End: 2021-04-28 | Stop reason: HOSPADM

## 2021-04-26 RX ADMIN — ONDANSETRON 4 MG: 2 INJECTION, SOLUTION INTRAMUSCULAR; INTRAVENOUS at 11:02

## 2021-04-26 RX ADMIN — ACETAMINOPHEN 1000 MG: 500 TABLET ORAL at 10:41

## 2021-04-26 RX ADMIN — MIDAZOLAM 4 MG: 1 INJECTION INTRAMUSCULAR; INTRAVENOUS at 10:51

## 2021-04-26 RX ADMIN — Medication 30 MG: at 11:01

## 2021-04-26 RX ADMIN — POLYETHYLENE GLYCOL 3350 17 G: 17 POWDER, FOR SOLUTION ORAL at 19:32

## 2021-04-26 RX ADMIN — HYDROMORPHONE HYDROCHLORIDE 0.5 MG: 1 INJECTION, SOLUTION INTRAMUSCULAR; INTRAVENOUS; SUBCUTANEOUS at 13:58

## 2021-04-26 RX ADMIN — Medication 20 MG: at 11:38

## 2021-04-26 RX ADMIN — PROPOFOL 120 MG: 10 INJECTION, EMULSION INTRAVENOUS at 10:55

## 2021-04-26 RX ADMIN — GABAPENTIN 100 MG: 100 CAPSULE ORAL at 20:54

## 2021-04-26 RX ADMIN — SODIUM CHLORIDE, POTASSIUM CHLORIDE, SODIUM LACTATE AND CALCIUM CHLORIDE: 600; 310; 30; 20 INJECTION, SOLUTION INTRAVENOUS at 10:16

## 2021-04-26 RX ADMIN — HYDROMORPHONE HYDROCHLORIDE 0.5 MG: 1 INJECTION, SOLUTION INTRAMUSCULAR; INTRAVENOUS; SUBCUTANEOUS at 14:21

## 2021-04-26 RX ADMIN — Medication 100 MCG: at 11:50

## 2021-04-26 RX ADMIN — DEXAMETHASONE SODIUM PHOSPHATE 10 MG: 10 INJECTION INTRAMUSCULAR; INTRAVENOUS at 11:02

## 2021-04-26 RX ADMIN — FENTANYL CITRATE 25 MCG: 50 INJECTION, SOLUTION INTRAMUSCULAR; INTRAVENOUS at 16:54

## 2021-04-26 RX ADMIN — TIZANIDINE 4 MG: 4 TABLET ORAL at 19:32

## 2021-04-26 RX ADMIN — CLINDAMYCIN PHOSPHATE 900 MG: 900 INJECTION, SOLUTION INTRAVENOUS at 11:18

## 2021-04-26 RX ADMIN — LOSARTAN POTASSIUM 50 MG: 50 TABLET, FILM COATED ORAL at 19:31

## 2021-04-26 RX ADMIN — FENTANYL CITRATE 25 MCG: 50 INJECTION, SOLUTION INTRAMUSCULAR; INTRAVENOUS at 17:04

## 2021-04-26 RX ADMIN — Medication 10 PUFF: at 11:02

## 2021-04-26 RX ADMIN — Medication 100 MCG: at 12:45

## 2021-04-26 RX ADMIN — FENTANYL CITRATE 50 MCG: 50 INJECTION, SOLUTION INTRAMUSCULAR; INTRAVENOUS at 10:54

## 2021-04-26 RX ADMIN — DOCUSATE SODIUM 50MG AND SENNOSIDES 8.6MG 1 TABLET: 8.6; 5 TABLET, FILM COATED ORAL at 19:32

## 2021-04-26 RX ADMIN — MORPHINE SULFATE 4 MG: 4 INJECTION, SOLUTION INTRAMUSCULAR; INTRAVENOUS at 20:55

## 2021-04-26 RX ADMIN — FENTANYL CITRATE 25 MCG: 50 INJECTION, SOLUTION INTRAMUSCULAR; INTRAVENOUS at 16:36

## 2021-04-26 RX ADMIN — FENTANYL CITRATE 50 MCG: 50 INJECTION, SOLUTION INTRAMUSCULAR; INTRAVENOUS at 11:26

## 2021-04-26 RX ADMIN — FENTANYL CITRATE 50 MCG: 50 INJECTION, SOLUTION INTRAMUSCULAR; INTRAVENOUS at 11:02

## 2021-04-26 RX ADMIN — SODIUM CHLORIDE, POTASSIUM CHLORIDE, SODIUM LACTATE AND CALCIUM CHLORIDE: 600; 310; 30; 20 INJECTION, SOLUTION INTRAVENOUS at 12:04

## 2021-04-26 RX ADMIN — CARVEDILOL 3.12 MG: 3.12 TABLET, FILM COATED ORAL at 19:31

## 2021-04-26 RX ADMIN — Medication 100 MCG: at 11:40

## 2021-04-26 RX ADMIN — Medication 100 MCG: at 11:25

## 2021-04-26 RX ADMIN — CLINDAMYCIN PHOSPHATE 900 MG: 900 INJECTION, SOLUTION INTRAVENOUS at 19:06

## 2021-04-26 RX ADMIN — Medication 200 MCG: at 12:32

## 2021-04-26 RX ADMIN — SODIUM CHLORIDE: 9 INJECTION, SOLUTION INTRAVENOUS at 19:02

## 2021-04-26 RX ADMIN — HYDROMORPHONE HYDROCHLORIDE 0.5 MG: 1 INJECTION, SOLUTION INTRAMUSCULAR; INTRAVENOUS; SUBCUTANEOUS at 14:07

## 2021-04-26 RX ADMIN — MAGNESIUM GLUCONATE 500 MG ORAL TABLET 400 MG: 500 TABLET ORAL at 19:32

## 2021-04-26 RX ADMIN — INSULIN LISPRO 1 UNITS: 100 INJECTION, SOLUTION INTRAVENOUS; SUBCUTANEOUS at 22:02

## 2021-04-26 RX ADMIN — FENTANYL CITRATE 50 MCG: 50 INJECTION, SOLUTION INTRAMUSCULAR; INTRAVENOUS at 12:10

## 2021-04-26 RX ADMIN — MORPHINE SULFATE 15 MG: 15 TABLET, FILM COATED, EXTENDED RELEASE ORAL at 19:00

## 2021-04-26 RX ADMIN — LIDOCAINE HYDROCHLORIDE 80 MG: 20 INJECTION, SOLUTION EPIDURAL; INFILTRATION; INTRACAUDAL; PERINEURAL at 10:55

## 2021-04-26 RX ADMIN — SUCCINYLCHOLINE CHLORIDE 100 MG: 20 INJECTION, SOLUTION INTRAMUSCULAR; INTRAVENOUS at 10:55

## 2021-04-26 RX ADMIN — HYDROMORPHONE HYDROCHLORIDE 0.5 MG: 1 INJECTION, SOLUTION INTRAMUSCULAR; INTRAVENOUS; SUBCUTANEOUS at 14:54

## 2021-04-26 RX ADMIN — OXYCODONE AND ACETAMINOPHEN 2 TABLET: 5; 325 TABLET ORAL at 19:41

## 2021-04-26 RX ADMIN — Medication 100 MCG: at 12:22

## 2021-04-26 RX ADMIN — INSULIN HUMAN 10 UNITS: 100 INJECTION, SOLUTION PARENTERAL at 14:47

## 2021-04-26 RX ADMIN — Medication 200 MCG: at 13:01

## 2021-04-26 ASSESSMENT — PAIN DESCRIPTION - PAIN TYPE
TYPE: SURGICAL PAIN
TYPE: SURGICAL PAIN

## 2021-04-26 ASSESSMENT — PULMONARY FUNCTION TESTS
PIF_VALUE: 24
PIF_VALUE: 26
PIF_VALUE: 3
PIF_VALUE: 26
PIF_VALUE: 27
PIF_VALUE: 25
PIF_VALUE: 27
PIF_VALUE: 25
PIF_VALUE: 26
PIF_VALUE: 27
PIF_VALUE: 1
PIF_VALUE: 27
PIF_VALUE: 25
PIF_VALUE: 25
PIF_VALUE: 27
PIF_VALUE: 23
PIF_VALUE: 3
PIF_VALUE: 3
PIF_VALUE: 1
PIF_VALUE: 19
PIF_VALUE: 30
PIF_VALUE: 26
PIF_VALUE: 1
PIF_VALUE: 16
PIF_VALUE: 27
PIF_VALUE: 27
PIF_VALUE: 25
PIF_VALUE: 25
PIF_VALUE: 17
PIF_VALUE: 25
PIF_VALUE: 24
PIF_VALUE: 26
PIF_VALUE: 19
PIF_VALUE: 22
PIF_VALUE: 20
PIF_VALUE: 3
PIF_VALUE: 16
PIF_VALUE: 25
PIF_VALUE: 27
PIF_VALUE: 21
PIF_VALUE: 26
PIF_VALUE: 27
PIF_VALUE: 26
PIF_VALUE: 25
PIF_VALUE: 24
PIF_VALUE: 19
PIF_VALUE: 26
PIF_VALUE: 25
PIF_VALUE: 24
PIF_VALUE: 28
PIF_VALUE: 26
PIF_VALUE: 26
PIF_VALUE: 2
PIF_VALUE: 24
PIF_VALUE: 4
PIF_VALUE: 19
PIF_VALUE: 26
PIF_VALUE: 19
PIF_VALUE: 25
PIF_VALUE: 25
PIF_VALUE: 28
PIF_VALUE: 26
PIF_VALUE: 26
PIF_VALUE: 19
PIF_VALUE: 29
PIF_VALUE: 37
PIF_VALUE: 28
PIF_VALUE: 20
PIF_VALUE: 19
PIF_VALUE: 26
PIF_VALUE: 27
PIF_VALUE: 28
PIF_VALUE: 26
PIF_VALUE: 27
PIF_VALUE: 26
PIF_VALUE: 27
PIF_VALUE: 26
PIF_VALUE: 27
PIF_VALUE: 19
PIF_VALUE: 9
PIF_VALUE: 24
PIF_VALUE: 25
PIF_VALUE: 26
PIF_VALUE: 5
PIF_VALUE: 25
PIF_VALUE: 26

## 2021-04-26 ASSESSMENT — PAIN SCALES - GENERAL
PAINLEVEL_OUTOF10: 6
PAINLEVEL_OUTOF10: 10
PAINLEVEL_OUTOF10: 9
PAINLEVEL_OUTOF10: 6
PAINLEVEL_OUTOF10: 7
PAINLEVEL_OUTOF10: 9
PAINLEVEL_OUTOF10: 9
PAINLEVEL_OUTOF10: 10
PAINLEVEL_OUTOF10: 9

## 2021-04-26 ASSESSMENT — PAIN DESCRIPTION - ORIENTATION
ORIENTATION: LOWER;MID
ORIENTATION: MID;LOWER

## 2021-04-26 ASSESSMENT — PAIN DESCRIPTION - ONSET: ONSET: ON-GOING

## 2021-04-26 ASSESSMENT — PAIN DESCRIPTION - FREQUENCY: FREQUENCY: CONTINUOUS

## 2021-04-26 ASSESSMENT — PAIN DESCRIPTION - PROGRESSION
CLINICAL_PROGRESSION: NOT CHANGED
CLINICAL_PROGRESSION: NOT CHANGED
CLINICAL_PROGRESSION: GRADUALLY WORSENING

## 2021-04-26 ASSESSMENT — PAIN - FUNCTIONAL ASSESSMENT
PAIN_FUNCTIONAL_ASSESSMENT: PREVENTS OR INTERFERES SOME ACTIVE ACTIVITIES AND ADLS
PAIN_FUNCTIONAL_ASSESSMENT: PREVENTS OR INTERFERES WITH MANY ACTIVE NOT PASSIVE ACTIVITIES

## 2021-04-26 ASSESSMENT — PAIN DESCRIPTION - DESCRIPTORS
DESCRIPTORS: ACHING;DISCOMFORT;SHARP;SPASM
DESCRIPTORS: ACHING;DISCOMFORT

## 2021-04-26 ASSESSMENT — PAIN DESCRIPTION - LOCATION
LOCATION: BACK
LOCATION: BACK

## 2021-04-26 NOTE — PROGRESS NOTES
Pt admitted to room #2015 from PACU  Oriented to room and call light/tv controls. Bed in lowest position, wheels locked, 2/4 side rails up  Call light in reach, room free of clutter, adequate lighting provided.

## 2021-04-26 NOTE — H&P
History and Physical Update    Pt Name: Margo Negro  MRN: [id number]  YOB: 1960  Date of evaluation: 4/26/2021      [x] I have reviewed the Progress notes sent by hard copy and  found in [facility] [place] dated 4/8/21 by Dr. Ernie Fernandez which meets the criteria for an Interval History and Physical note and is attached below. Procedure:Left TLIF L3-4  Dx: Lumbar Spondylolithesis   [x] I have examined  Margo Negro and there are no changes to the patient or plans. Denies recent illness fevers or chills. Denies the use of blood thinners. Stopped taking all supplements. States prior to arrival blood sugar was 124 mg/dl. Vital signs: BP (!) 173/76   Pulse 65   Temp 96.8 °F (36 °C) (Temporal)   Resp 18   Ht 5' (1.524 m)   Wt 180 lb (81.6 kg)   LMP 04/09/2016   SpO2 96%   BMI 35.15 kg/m²     Allergies:  Latex, Pcn [penicillins], Naproxen, Pioglitazone, Pramlintide, Sulfa antibiotics, Pramlintide acetate, and Sulfamethoxazole-trimethoprim    Medications:   No current facility-administered medications on file prior to encounter. No current outpatient medications on file prior to encounter. Prior to Admission medications    Medication Sig Start Date End Date Taking?  Authorizing Provider   insulin glargine (LANTUS) 100 UNIT/ML injection vial Inject 25 Units into the skin every morning (before breakfast)   Yes Historical Provider, MD   insulin lispro (HUMALOG) 100 UNIT/ML injection cartridge Inject 6 Units into the skin as needed (sliding scale will vary on dose taken) Sliding scale    Yes Historical Provider, MD   carvedilol (COREG) 3.125 MG tablet 1 tablet with food   Yes Historical Provider, MD   gabapentin (NEURONTIN) 100 MG capsule 1 capsule   Yes Historical Provider, MD   losartan (COZAAR) 50 MG tablet 1 tablet   Yes Historical Provider, MD   Multiple Vitamin (MULTIVITAMIN ADULT PO) Take 1 tablet by mouth daily   Yes Historical Provider, MD   predniSONE 5 MG (21) TBPK 1 tablet   Yes Historical Provider, MD   tiZANidine (ZANAFLEX) 2 MG capsule 1 tablet as needed   Yes Historical Provider, MD   Cholecalciferol (VITAMIN D3) 360175 UNIT/GM POWD 2 tablets   Yes Historical Provider, MD   ibuprofen (ADVIL;MOTRIN) 600 MG tablet Take 600 mg by mouth every 6 hours as needed   Yes Historical Provider, MD   nystatin (MYCOSTATIN) 849116 UNIT/GM powder Apply topically 4 times daily 3/24/21  Yes Historical Provider, MD   furosemide (LASIX) 40 MG tablet Take 40 mg by mouth 2 times daily   Yes Historical Provider, MD   oxyCODONE-acetaminophen (PERCOCET) 5-325 MG per tablet Take 2 tablets by mouth every 6 hours as needed for Pain (4 tablets every 6 hours). Yes Historical Provider, MD   zinc gluconate 50 MG tablet Take 50 mg by mouth daily   Yes Historical Provider, MD   magnesium oxide (MAG-OX) 400 MG tablet Take 400 mg by mouth daily   Yes Historical Provider, MD   calcium carbonate (OSCAL) 500 MG TABS tablet Take 500 mg by mouth daily   Yes Historical Provider, MD   Ascorbic Acid (VITAMIN C) 250 MG tablet Take 250 mg by mouth daily   Yes Historical Provider, MD   Flaxseed, Linseed, (FLAX SEEDS PO) Take by mouth   Yes Historical Provider, MD   Milk Thistle 1000 MG CAPS Take by mouth   Yes Historical Provider, MD   Turmeric 500 MG CAPS Take by mouth   Yes Historical Provider, MD   LYSINE PO Take by mouth   Yes Historical Provider, MD   Multiple Vitamins-Minerals (THERAPEUTIC MULTIVITAMIN-MINERALS) tablet Take 1 tablet by mouth daily   Yes Historical Provider, MD       This is a 57 y.o. female who is  pleasant, cooperative, alert and oriented x3, in no acute distress. Heart: Heart regular rate and rhythm   Lungs:clear to auscultation without wheezes or rales    Abdomen: soft, nontender, nondistended, no masses or organomegaly.        Recent Labs     04/22/21  1420   COVID19       Not Detected       GERRI Schwartz CNP  Electronically signed 4/26/2021 at 9:53 AM

## 2021-04-26 NOTE — BRIEF OP NOTE
Brief Postoperative Note      Patient: Beti Earl  YOB: 1960  MRN: [id number]    Date of Procedure: 4/26/2021    Pre-Op Diagnosis: DX LUMBAR SPONDYLOLISTHESIS, STENOSIS L3-4    Post-Op Diagnosis: Same       Procedure(s):  LEFT L3-4 TLIF  WITH POSTEROLATERAL SPINAL FUSION    Surgeon(s):  Clive Mckinney MD    Assistant:  Surgical Assistant: Edgar Marley Assistant: Ana Espinoza    Anesthesia: General    Estimated Blood Loss (mL): 248     Complications: None    Specimens:   * No specimens in log *    Implants:  Implant Name Type Inv. Item Serial No.  Lot No. LRB No. Used Action   [facility]. [facility] [id number] Bone/Graft/Tissue/Human/Synth G[facility]. [facility] [id number] COMMUNITY TISSUE SVCS-PMM  Left 1 Implanted   GRAFT BNE SUB 5ML MTRX CELLULAR OSTEOCEL + - [id number]  GRAFT BNE SUB 5ML MTRX CELLULAR OSTEOCEL + [id number] [employer]  Left 1 Implanted   SCREW SPNL L45MM DIA6. 5MM POLYAX 2C RELINE MAS  SCREW SPNL L45MM DIA6. 5MM POLYAX 2C RELINE MAS  [employer]  Left 2 Implanted   SCREW SPNL L45MM DIA6. 5MM POST THORACOLUMBOSACRAL MOD SHANK  SCREW SPNL L45MM DIA6. 5MM POST THORACOLUMBOSACRAL MOD SHANK  [employer]WD  Left 2 Implanted   SCREW SPNL MOD TULIP RELINE  SCREW SPNL MOD TULIP RELINE  [employer]  Left 2 Implanted   SCREW SPNL DIA5. 5MM OPN TULIP JUAN RELINE  SCREW SPNL DIA5. 5MM OPN TULIP JUAN RELINE  [employer]  Left 4 Implanted   CAGE SPNL 4 [id number] OBLQ TRANSFORAMINAL LUM INTBDY  CAGE SPNL 4 [id number] OBLQ TRANSFORAMINAL LUM INTBDY  [employer]  Left 1 Implanted   DEBI SPNL L45MM DIA5. 5MM POST THORACOLUMBOSACRAL TI LORD  DEBI SPNL L45MM DIA5. 5MM POST THORACOLUMBOSACRAL TI LORD  [employer]  Left 1 Implanted         Drains:   Urethral Catheter Non-latex 16 fr (Active)         Electronically signed by Conor Vance MD on 4/26/2021 at 1:14 PM

## 2021-04-26 NOTE — PROGRESS NOTES
DATE: 2021    NAME: Lilibeth Malik  MRN: [id number]   : 1960      Patient not seen this date for Physical Therapy due to:    5:38pm  Called PACU to attempt PT eval there and stated pt. Is about to be transferred to her room on Med. Surg. Due to time needed for transfer and for RN to admit pt. To floor, called RN and asked for staff to amb. this PM.  Will evaluate for PT first thing AM 21.       Electronically signed by Anshu Castanon PT on 2021 at 5:38 PM

## 2021-04-26 NOTE — ANESTHESIA POSTPROCEDURE EVALUATION
Department of Anesthesiology  Postprocedure Note    Patient: Lola Santos  MRN: [id number]  YOB: 1960  Date of evaluation: 4/26/2021  Time:  3:59 PM     Procedure Summary     Date: 04/26/21 Room / Location: [address]'[facility] - INPATIENT    Anesthesia Start: 2740 Anesthesia Stop: 9973    Procedure: LEFT L3-4 TLIF  - Vernelle Scheuermann, 2 CARMS (Left Back) Diagnosis: (DX LUMBAR SPONDYLOLISTHESIS)    Surgeons: Og Barrow MD Responsible Provider: Allie Calle DO    Anesthesia Type: general ASA Status: 3          Anesthesia Type: No value filed. Delma Phase I: Delma Score: 9    Delma Phase II:      Last vitals: Reviewed and per EMR flowsheets.        Anesthesia Post Evaluation    Patient location during evaluation: PACU  Patient participation: complete - patient participated  Level of consciousness: awake and alert  Airway patency: patent  Nausea & Vomiting: no nausea and no vomiting  Complications: no  Cardiovascular status: hemodynamically stable  Respiratory status: acceptable  Hydration status: stable

## 2021-04-26 NOTE — ANESTHESIA PRE PROCEDURE
Department of Anesthesiology  Preprocedure Note       Name:  Lauren Junior   Age:  57 y.o.  :  1960                                          MRN:  [id number]         Date:  2021      Surgeon: Marty Gordon):  Pete Pedersen MD    Procedure: Procedure(s):  LEFT L3-4 TLIF  - NUVASIVE, 2 CARMS    Medications prior to admission:   Prior to Admission medications    Medication Sig Start Date End Date Taking? Authorizing Provider   insulin glargine (LANTUS) 100 UNIT/ML injection vial Inject 25 Units into the skin every morning (before breakfast)   Yes Historical Provider, MD   insulin lispro (HUMALOG) 100 UNIT/ML injection cartridge Inject 6 Units into the skin as needed (sliding scale will vary on dose taken) Sliding scale    Yes Historical Provider, MD   carvedilol (COREG) 3.125 MG tablet 1 tablet with food   Yes Historical Provider, MD   gabapentin (NEURONTIN) 100 MG capsule 1 capsule   Yes Historical Provider, MD   losartan (COZAAR) 50 MG tablet 1 tablet   Yes Historical Provider, MD   Multiple Vitamin (MULTIVITAMIN ADULT PO) Take 1 tablet by mouth daily   Yes Historical Provider, MD   predniSONE 5 MG (21) TBPK 1 tablet   Yes Historical Provider, MD   tiZANidine (ZANAFLEX) 2 MG capsule 1 tablet as needed   Yes Historical Provider, MD   Cholecalciferol (VITAMIN D3) 741374 UNIT/GM POWD 2 tablets   Yes Historical Provider, MD   ibuprofen (ADVIL;MOTRIN) 600 MG tablet Take 600 mg by mouth every 6 hours as needed   Yes Historical Provider, MD   nystatin (MYCOSTATIN) 386595 UNIT/GM powder Apply topically 4 times daily 3/24/21  Yes Historical Provider, MD   furosemide (LASIX) 40 MG tablet Take 40 mg by mouth 2 times daily   Yes Historical Provider, MD   oxyCODONE-acetaminophen (PERCOCET) 5-325 MG per tablet Take 2 tablets by mouth every 6 hours as needed for Pain (4 tablets every 6 hours).    Yes Historical Provider, MD   zinc gluconate 50 MG tablet Take 50 mg by mouth daily   Yes Historical Provider, MD   magnesium oxide (MAG-OX) 400 MG tablet Take 400 mg by mouth daily   Yes Historical Provider, MD   calcium carbonate (OSCAL) 500 MG TABS tablet Take 500 mg by mouth daily   Yes Historical Provider, MD   Ascorbic Acid (VITAMIN C) 250 MG tablet Take 250 mg by mouth daily   Yes Historical Provider, MD   Flaxseed, Linseed, (FLAX SEEDS PO) Take by mouth   Yes Historical Provider, MD   Milk Thistle 1000 MG CAPS Take by mouth   Yes Historical Provider, MD   Turmeric 500 MG CAPS Take by mouth   Yes Historical Provider, MD   LYSINE PO Take by mouth   Yes Historical Provider, MD   Multiple Vitamins-Minerals (THERAPEUTIC MULTIVITAMIN-MINERALS) tablet Take 1 tablet by mouth daily   Yes Historical Provider, MD       Current medications:    Current Facility-Administered Medications   Medication Dose Route Frequency Provider Last Rate Last Admin    lactated ringers infusion   Intravenous Continuous Ndal Marci Gipson MD 0 mL/hr at 04/26/21 1203 New Bag at 04/26/21 1204    sodium chloride flush 0.9 % injection 10 mL  10 mL Intravenous 2 times per day Sultana Grayson MD        sodium chloride flush 0.9 % injection 10 mL  10 mL Intravenous PRN Sultana Grayson MD        0.9 % sodium chloride infusion  25 mL Intravenous PRN Sultana Grayson MD        lidocaine PF 1 % injection 1 mL  1 mL Intradermal Once PRN Sultana Grayson MD        sodium chloride 0.9 % 1,000 mL with polymyxin B 500,000 Units    PRN Nanci Bowling MD   500 mL at 04/26/21 1135    gelatin adsorbable (GELFOAM) sponge    PRN Nanci Bowling MD   1 each at 04/26/21 1135    surgiflo hemostatic matrix    PRN Nanci Bowling MD   1 each at 04/26/21 1135    thrombin kit    PRN Nanci Bowling MD   20,000 Units at 04/26/21 1135    bupivacaine-EPINEPHrine PF (MARCAINE-w/EPINEPHRINE) 0.5% -1:912102 injection    PRN Nanci Bowling MD   30 mL at 04/26/21 1136    fentaNYL (SUBLIMAZE) injection 25 mcg  25 mcg Intravenous Q5 Min PRN Amara Burden DO        HYDROmorphone HCl PF (DILAUDID) injection 0.5 mg  0.5 mg Intravenous Q5 Min PRN Druscilla Salter, DO        fentaNYL (SUBLIMAZE) injection 25 mcg  25 mcg Intravenous Q5 Min PRN Druscilla Salter, DO        HYDROmorphone HCl PF (DILAUDID) injection 0.5 mg  0.5 mg Intravenous Q5 Min PRN Druscilla Salter, DO        oxyCODONE-acetaminophen (PERCOCET) 5-325 MG per tablet 1 tablet  1 tablet Oral PRN Druscilla Salter, DO        Or    oxyCODONE-acetaminophen (PERCOCET) 5-325 MG per tablet 2 tablet  2 tablet Oral PRN Druscilla Salter, DO        ondansetron [facility]) injection 4 mg  4 mg Intravenous Once PRN Martin Quiros, DO        promethazine (PHENERGAN) injection 6.25 mg  6.25 mg Intravenous Once PRN Druscilla Salter, DO        labetalol (NORMODYNE;TRANDATE) injection 5 mg  5 mg Intravenous Q10 Min PRN Druscilla Salter, DO        hydrALAZINE (APRESOLINE) injection 5 mg  5 mg Intravenous Q10 Min PRN Druscilla Salter, DO           Allergies:     Allergies   Allergen Reactions    Latex Rash    Pcn [Penicillins] Rash    Naproxen     Pioglitazone     Pramlintide      Other reaction(s): painful injections    Sulfa Antibiotics     Pramlintide Acetate Rash    Sulfamethoxazole-Trimethoprim Rash       Problem List:    Patient Active Problem List   Diagnosis Code    Spondylolisthesis, acquired M43.10    Spinal stenosis, lumbar region with neurogenic claudication M48.062       Past Medical History:        Diagnosis Date    Arthritis     [facility]     Diabetic retinopathy ([facility]     GERD (gastroesophageal reflux disease)     Hypertension     Polymyalgia ([facility]        Past Surgical History:        Procedure Laterality Date    CARDIAC SURGERY       negative 2006     SECTION      COLONOSCOPY      ENDOSCOPY, COLON, DIAGNOSTIC      EYE SURGERY      injections     FINGER TRIGGER RELEASE         Social History:    Social History     Tobacco Use    Smoking status: Former Smoker    Smokeless tobacco: Never Used Substance Use Topics    Alcohol use: Not Currently                                Counseling given: Not Answered      Vital Signs (Current):   Vitals:    04/26/21 0918 04/26/21 0922 04/26/21 1327 04/26/21 1330   BP:  (!) 173/76 125/70 125/70   Pulse:  65 70 70   Resp:  18 21 19   Temp:  96.8 °F (36 °C) 97.8 °F (36.6 °C)    TempSrc:  Temporal Temporal    SpO2:  96% 98% 98%   Weight: 180 lb (81.6 kg)      Height: 5' (1.524 m)                                                 BP Readings from Last 3 Encounters:   04/26/21 125/70   04/26/21 (!) 85/51   04/09/21 (!) 158/65       NPO Status: Time of last liquid consumption: 1100                        Time of last solid consumption: 2300                        Date of last liquid consumption: 04/25/21                        Date of last solid food consumption: 04/25/21    BMI:   Wt Readings from Last 3 Encounters:   04/26/21 180 lb (81.6 kg)   04/09/21 180 lb 3.2 oz (81.7 kg)     Body mass index is 35.15 kg/m².     CBC:   Lab Results   Component Value Date    WBC 7.6 04/09/2021    RBC 4.00 04/09/2021    HGB 12.0 04/09/2021    HCT 38.1 04/09/2021    MCV 95.3 04/09/2021    RDW 13.5 04/09/2021     04/09/2021       CMP:   Lab Results   Component Value Date     04/09/2021    K 4.3 04/09/2021     04/09/2021    CO2 26 04/09/2021    BUN 19 04/09/2021    CREATININE 0.91 04/09/2021    GFRAA >60 04/09/2021    LABGLOM >60 04/09/2021    GLUCOSE 178 04/09/2021    CALCIUM 9.3 04/09/2021       POC Tests:   Recent Labs     04/26/21  1015   POCGLU 169*       Coags:   Lab Results   Component Value Date    PROTIME 11.9 04/09/2021    INR 0.9 04/09/2021    APTT 23.9 04/09/2021       HCG (If Applicable): No results found for: PREGTESTUR, PREGSERUM, HCG, HCGQUANT     ABGs: No results found for: PHART, PO2ART, JES8QFI, GTL6MQQ, BEART, L7HWUWVQ     Type & Screen (If Applicable):  No results found for: LABABO, LABRH    Drug/Infectious Status (If Applicable):  No results found for: HIV, HEPCAB    COVID-19 Screening (If Applicable):   Lab Results   Component Value Date    COVID19 Not Detected 04/22/2021           Anesthesia Evaluation  Patient summary reviewed and Nursing notes reviewed no history of anesthetic complications:   Airway: Mallampati: II  TM distance: >3 FB   Neck ROM: full  Mouth opening: > = 3 FB Dental: normal exam         Pulmonary:normal exam        (-) COPD and asthma                           Cardiovascular:  Exercise tolerance: no interval change,   (+) hypertension:,     (-) CAD and CABG/stent        Rate: normal                    Neuro/Psych:               GI/Hepatic/Renal:   (+) GERD:,           Endo/Other:    (+) Diabetes, : arthritis:., .                 Abdominal:           Vascular:                                        Anesthesia Plan      general     ASA 3       Induction: intravenous. Anesthetic plan and risks discussed with patient. Plan discussed with CRNA.     Attending anesthesiologist reviewed and agrees with Pre Eval content              Emil Mata DO   4/26/2021

## 2021-04-26 NOTE — PLAN OF CARE
Problem: Pain:  Description: Pain management should include both nonpharmacologic and pharmacologic interventions. Goal: Pain level will decrease  Description: Pain level will decrease  Outcome: Ongoing  Goal: Control of acute pain  Description: Control of acute pain  Outcome: Ongoing  Goal: Control of chronic pain  Description: Control of chronic pain  Outcome: Ongoing     Problem: Skin Integrity:  Goal: Will show no infection signs and symptoms  Description: Will show no infection signs and symptoms  Outcome: Ongoing  Goal: Absence of new skin breakdown  Description: Absence of new skin breakdown  Outcome: Ongoing     Problem: Falls - Risk of:  Goal: Will remain free from falls  Description: Will remain free from falls  Outcome: Ongoing  Note: Siderails up x 2  Hourly rounding  Call light in reach  Instructed to call for assist before attempting out of bed. Remains free from falls and accidental injury at this time   Floor free from obstacles  Bed is locked and in lowest position  Adequate lighting provided  Bed alarm on, [place] star and Stay with Me signs posted      Goal: Absence of physical injury  Description: Absence of physical injury  Outcome: Ongoing     Problem: SAFETY  Goal: Free from accidental physical injury  Outcome: Ongoing  Goal: Free from intentional harm  Outcome: Ongoing     Problem: DAILY CARE  Goal: Daily care needs are met  Outcome: Ongoing     Problem: PAIN  Goal: Patient's pain/discomfort is manageable  Outcome: Ongoing     Problem: SKIN INTEGRITY  Goal: Skin integrity is maintained or improved  Outcome: Ongoing     Problem: KNOWLEDGE DEFICIT  Goal: Patient/S.O. demonstrates understanding of disease process, treatment plan, medications, and discharge instructions.   Outcome: Ongoing     Problem: DISCHARGE BARRIERS  Goal: Patient's continuum of care needs are met  Outcome: Ongoing

## 2021-04-27 PROBLEM — D62 POSTOPERATIVE ANEMIA DUE TO ACUTE BLOOD LOSS: Status: ACTIVE | Noted: 2021-04-27

## 2021-04-27 LAB
ANION GAP SERPL CALCULATED.3IONS-SCNC: 11 MMOL/L (ref 9–17)
BUN BLDV-MCNC: 13 MG/DL (ref 8–23)
BUN/CREAT BLD: 20 (ref 9–20)
CALCIUM SERPL-MCNC: 8.7 MG/DL (ref 8.6–10.4)
CHLORIDE BLD-SCNC: 99 MMOL/L (ref 98–107)
CO2: 25 MMOL/L (ref 20–31)
CREAT SERPL-MCNC: 0.66 MG/DL (ref 0.5–0.9)
ESTIMATED AVERAGE GLUCOSE: 223 MG/DL
GFR AFRICAN AMERICAN: >60 ML/MIN
GFR NON-AFRICAN AMERICAN: >60 ML/MIN
GFR SERPL CREATININE-BSD FRML MDRD: ABNORMAL ML/MIN/{1.73_M2}
GFR SERPL CREATININE-BSD FRML MDRD: ABNORMAL ML/MIN/{1.73_M2}
GLUCOSE BLD-MCNC: 181 MG/DL (ref 65–105)
GLUCOSE BLD-MCNC: 223 MG/DL (ref 65–105)
GLUCOSE BLD-MCNC: 237 MG/DL (ref 65–105)
GLUCOSE BLD-MCNC: 280 MG/DL (ref 65–105)
GLUCOSE BLD-MCNC: 314 MG/DL (ref 70–99)
HBA1C MFR BLD: 9.4 % (ref 4–6)
HCT VFR BLD CALC: 32.5 % (ref 36.3–47.1)
HEMOGLOBIN: 10.4 G/DL (ref 11.9–15.1)
MCH RBC QN AUTO: 30.6 PG (ref 25.2–33.5)
MCHC RBC AUTO-ENTMCNC: 32 G/DL (ref 28.4–34.8)
MCV RBC AUTO: 95.6 FL (ref 82.6–102.9)
NRBC AUTOMATED: 0 PER 100 WBC
PDW BLD-RTO: 13.3 % (ref 11.8–14.4)
PLATELET # BLD: 246 K/UL (ref 138–453)
PMV BLD AUTO: 10.2 FL (ref 8.1–13.5)
POTASSIUM SERPL-SCNC: 4.5 MMOL/L (ref 3.7–5.3)
RBC # BLD: 3.4 M/UL (ref 3.95–5.11)
SODIUM BLD-SCNC: 135 MMOL/L (ref 135–144)
WBC # BLD: 12 K/UL (ref 3.5–11.3)

## 2021-04-27 PROCEDURE — 82947 ASSAY GLUCOSE BLOOD QUANT: CPT

## 2021-04-27 PROCEDURE — 80048 BASIC METABOLIC PNL TOTAL CA: CPT

## 2021-04-27 PROCEDURE — 2500000003 HC RX 250 WO HCPCS: Performed by: ORTHOPAEDIC SURGERY

## 2021-04-27 PROCEDURE — 36415 COLL VENOUS BLD VENIPUNCTURE: CPT

## 2021-04-27 PROCEDURE — 6370000000 HC RX 637 (ALT 250 FOR IP): Performed by: NURSE PRACTITIONER

## 2021-04-27 PROCEDURE — 97167 OT EVAL HIGH COMPLEX 60 MIN: CPT

## 2021-04-27 PROCEDURE — 83036 HEMOGLOBIN GLYCOSYLATED A1C: CPT

## 2021-04-27 PROCEDURE — 2500000003 HC RX 250 WO HCPCS: Performed by: NURSE PRACTITIONER

## 2021-04-27 PROCEDURE — 97163 PT EVAL HIGH COMPLEX 45 MIN: CPT

## 2021-04-27 PROCEDURE — 97112 NEUROMUSCULAR REEDUCATION: CPT

## 2021-04-27 PROCEDURE — 85027 COMPLETE CBC AUTOMATED: CPT

## 2021-04-27 PROCEDURE — 2580000003 HC RX 258: Performed by: ORTHOPAEDIC SURGERY

## 2021-04-27 PROCEDURE — 6360000002 HC RX W HCPCS: Performed by: NURSE PRACTITIONER

## 2021-04-27 PROCEDURE — 97530 THERAPEUTIC ACTIVITIES: CPT

## 2021-04-27 PROCEDURE — 1200000000 HC SEMI PRIVATE

## 2021-04-27 PROCEDURE — 97535 SELF CARE MNGMENT TRAINING: CPT

## 2021-04-27 PROCEDURE — 6360000002 HC RX W HCPCS: Performed by: ORTHOPAEDIC SURGERY

## 2021-04-27 PROCEDURE — 99253 IP/OBS CNSLTJ NEW/EST LOW 45: CPT | Performed by: INTERNAL MEDICINE

## 2021-04-27 PROCEDURE — 6370000000 HC RX 637 (ALT 250 FOR IP): Performed by: ORTHOPAEDIC SURGERY

## 2021-04-27 RX ORDER — LORAZEPAM 2 MG/ML
0.5 INJECTION INTRAMUSCULAR ONCE
Status: COMPLETED | OUTPATIENT
Start: 2021-04-27 | End: 2021-04-27

## 2021-04-27 RX ORDER — METOPROLOL TARTRATE 5 MG/5ML
2.5 INJECTION INTRAVENOUS ONCE
Status: COMPLETED | OUTPATIENT
Start: 2021-04-27 | End: 2021-04-27

## 2021-04-27 RX ORDER — TIZANIDINE 4 MG/1
4 TABLET ORAL EVERY 8 HOURS PRN
Qty: 50 TABLET | Refills: 0 | Status: SHIPPED | OUTPATIENT
Start: 2021-04-27

## 2021-04-27 RX ORDER — SENNA AND DOCUSATE SODIUM 50; 8.6 MG/1; MG/1
1 TABLET, FILM COATED ORAL 2 TIMES DAILY
Qty: 60 TABLET | Refills: 0 | Status: SHIPPED | OUTPATIENT
Start: 2021-04-27

## 2021-04-27 RX ORDER — ONDANSETRON 2 MG/ML
4 INJECTION INTRAMUSCULAR; INTRAVENOUS EVERY 6 HOURS PRN
Status: DISCONTINUED | OUTPATIENT
Start: 2021-04-27 | End: 2021-04-28 | Stop reason: HOSPADM

## 2021-04-27 RX ORDER — MORPHINE SULFATE 15 MG/1
15 TABLET, FILM COATED, EXTENDED RELEASE ORAL EVERY 12 HOURS SCHEDULED
Qty: 6 TABLET | Refills: 0 | Status: SHIPPED | OUTPATIENT
Start: 2021-04-27 | End: 2021-04-30

## 2021-04-27 RX ORDER — FUROSEMIDE 40 MG/1
40 TABLET ORAL DAILY
Status: DISCONTINUED | OUTPATIENT
Start: 2021-04-28 | End: 2021-04-28 | Stop reason: HOSPADM

## 2021-04-27 RX ORDER — OXYCODONE HYDROCHLORIDE AND ACETAMINOPHEN 5; 325 MG/1; MG/1
1-2 TABLET ORAL EVERY 4 HOURS PRN
Qty: 60 TABLET | Refills: 0 | Status: SHIPPED | OUTPATIENT
Start: 2021-04-27 | End: 2021-05-04

## 2021-04-27 RX ADMIN — POLYETHYLENE GLYCOL 3350 17 G: 17 POWDER, FOR SOLUTION ORAL at 09:04

## 2021-04-27 RX ADMIN — BENZOCAINE AND MENTHOL 1 LOZENGE: 15; 3.6 LOZENGE ORAL at 10:41

## 2021-04-27 RX ADMIN — LOSARTAN POTASSIUM 50 MG: 50 TABLET, FILM COATED ORAL at 09:04

## 2021-04-27 RX ADMIN — TIZANIDINE 4 MG: 4 TABLET ORAL at 06:00

## 2021-04-27 RX ADMIN — INSULIN GLARGINE 25 UNITS: 100 INJECTION, SOLUTION SUBCUTANEOUS at 09:05

## 2021-04-27 RX ADMIN — SODIUM CHLORIDE: 9 INJECTION, SOLUTION INTRAVENOUS at 09:13

## 2021-04-27 RX ADMIN — OXYCODONE AND ACETAMINOPHEN 2 TABLET: 5; 325 TABLET ORAL at 06:00

## 2021-04-27 RX ADMIN — CARVEDILOL 3.12 MG: 3.12 TABLET, FILM COATED ORAL at 17:16

## 2021-04-27 RX ADMIN — SODIUM CHLORIDE: 9 INJECTION, SOLUTION INTRAVENOUS at 01:15

## 2021-04-27 RX ADMIN — MORPHINE SULFATE 15 MG: 15 TABLET, FILM COATED, EXTENDED RELEASE ORAL at 23:18

## 2021-04-27 RX ADMIN — DOCUSATE SODIUM 50MG AND SENNOSIDES 8.6MG 1 TABLET: 8.6; 5 TABLET, FILM COATED ORAL at 09:04

## 2021-04-27 RX ADMIN — INSULIN LISPRO 2 UNITS: 100 INJECTION, SOLUTION INTRAVENOUS; SUBCUTANEOUS at 23:18

## 2021-04-27 RX ADMIN — FUROSEMIDE 40 MG: 40 TABLET ORAL at 09:04

## 2021-04-27 RX ADMIN — INSULIN LISPRO 2 UNITS: 100 INJECTION, SOLUTION INTRAVENOUS; SUBCUTANEOUS at 17:16

## 2021-04-27 RX ADMIN — OXYCODONE AND ACETAMINOPHEN 2 TABLET: 5; 325 TABLET ORAL at 10:41

## 2021-04-27 RX ADMIN — CLINDAMYCIN PHOSPHATE 900 MG: 900 INJECTION, SOLUTION INTRAVENOUS at 01:38

## 2021-04-27 RX ADMIN — ONDANSETRON 4 MG: 2 INJECTION INTRAMUSCULAR; INTRAVENOUS at 01:16

## 2021-04-27 RX ADMIN — SODIUM CHLORIDE: 9 INJECTION, SOLUTION INTRAVENOUS at 23:25

## 2021-04-27 RX ADMIN — MORPHINE SULFATE 4 MG: 4 INJECTION, SOLUTION INTRAMUSCULAR; INTRAVENOUS at 03:45

## 2021-04-27 RX ADMIN — GABAPENTIN 100 MG: 100 CAPSULE ORAL at 15:12

## 2021-04-27 RX ADMIN — SODIUM CHLORIDE, PRESERVATIVE FREE 10 ML: 5 INJECTION INTRAVENOUS at 09:05

## 2021-04-27 RX ADMIN — GABAPENTIN 100 MG: 100 CAPSULE ORAL at 09:04

## 2021-04-27 RX ADMIN — LORAZEPAM 0.5 MG: 2 INJECTION, SOLUTION INTRAMUSCULAR; INTRAVENOUS at 01:39

## 2021-04-27 RX ADMIN — OXYCODONE AND ACETAMINOPHEN 2 TABLET: 5; 325 TABLET ORAL at 15:11

## 2021-04-27 RX ADMIN — BENZOCAINE AND MENTHOL 1 LOZENGE: 15; 3.6 LOZENGE ORAL at 03:50

## 2021-04-27 RX ADMIN — GABAPENTIN 100 MG: 100 CAPSULE ORAL at 23:18

## 2021-04-27 RX ADMIN — CARVEDILOL 3.12 MG: 3.12 TABLET, FILM COATED ORAL at 09:04

## 2021-04-27 RX ADMIN — INSULIN LISPRO 6 UNITS: 100 INJECTION, SOLUTION INTRAVENOUS; SUBCUTANEOUS at 09:05

## 2021-04-27 RX ADMIN — MAGNESIUM GLUCONATE 500 MG ORAL TABLET 400 MG: 500 TABLET ORAL at 09:04

## 2021-04-27 RX ADMIN — METOPROLOL TARTRATE 2.5 MG: 5 INJECTION INTRAVENOUS at 00:29

## 2021-04-27 RX ADMIN — MORPHINE SULFATE 4 MG: 4 INJECTION, SOLUTION INTRAMUSCULAR; INTRAVENOUS at 00:28

## 2021-04-27 RX ADMIN — OXYCODONE AND ACETAMINOPHEN 2 TABLET: 5; 325 TABLET ORAL at 20:22

## 2021-04-27 RX ADMIN — OXYCODONE AND ACETAMINOPHEN 2 TABLET: 5; 325 TABLET ORAL at 01:38

## 2021-04-27 RX ADMIN — TIZANIDINE 4 MG: 4 TABLET ORAL at 15:12

## 2021-04-27 RX ADMIN — BENZOCAINE AND MENTHOL 1 LOZENGE: 15; 3.6 LOZENGE ORAL at 01:16

## 2021-04-27 RX ADMIN — MORPHINE SULFATE 15 MG: 15 TABLET, FILM COATED, EXTENDED RELEASE ORAL at 09:04

## 2021-04-27 RX ADMIN — DOCUSATE SODIUM 50MG AND SENNOSIDES 8.6MG 1 TABLET: 8.6; 5 TABLET, FILM COATED ORAL at 23:17

## 2021-04-27 RX ADMIN — INSULIN LISPRO 4 UNITS: 100 INJECTION, SOLUTION INTRAVENOUS; SUBCUTANEOUS at 12:13

## 2021-04-27 ASSESSMENT — PAIN - FUNCTIONAL ASSESSMENT
PAIN_FUNCTIONAL_ASSESSMENT: PREVENTS OR INTERFERES SOME ACTIVE ACTIVITIES AND ADLS
PAIN_FUNCTIONAL_ASSESSMENT: PREVENTS OR INTERFERES SOME ACTIVE ACTIVITIES AND ADLS

## 2021-04-27 ASSESSMENT — PAIN DESCRIPTION - PAIN TYPE
TYPE: SURGICAL PAIN

## 2021-04-27 ASSESSMENT — PAIN DESCRIPTION - DESCRIPTORS
DESCRIPTORS: CONSTANT;DISCOMFORT;SORE
DESCRIPTORS: ACHING;DISCOMFORT
DESCRIPTORS: ACHING;BURNING;DISCOMFORT
DESCRIPTORS: ACHING;DISCOMFORT

## 2021-04-27 ASSESSMENT — PAIN DESCRIPTION - FREQUENCY
FREQUENCY: CONTINUOUS

## 2021-04-27 ASSESSMENT — PAIN DESCRIPTION - ORIENTATION
ORIENTATION: MID;LOWER

## 2021-04-27 ASSESSMENT — PAIN DESCRIPTION - LOCATION
LOCATION: BACK

## 2021-04-27 ASSESSMENT — PAIN DESCRIPTION - PROGRESSION
CLINICAL_PROGRESSION: NOT CHANGED
CLINICAL_PROGRESSION: NOT CHANGED

## 2021-04-27 ASSESSMENT — PAIN SCALES - GENERAL
PAINLEVEL_OUTOF10: 5
PAINLEVEL_OUTOF10: 7
PAINLEVEL_OUTOF10: 7
PAINLEVEL_OUTOF10: 6
PAINLEVEL_OUTOF10: 7
PAINLEVEL_OUTOF10: 9
PAINLEVEL_OUTOF10: 3
PAINLEVEL_OUTOF10: 7
PAINLEVEL_OUTOF10: 7
PAINLEVEL_OUTOF10: 6

## 2021-04-27 ASSESSMENT — PAIN DESCRIPTION - ONSET
ONSET: ON-GOING

## 2021-04-27 NOTE — PROGRESS NOTES
Physical Therapy    Facility/Department: [facility] MED SURG  Initial Assessment    NAME: Brennen Pelaez  : 1960  MRN: [id number]    Date of Service: 2021    Discharge Recommendations:  Patient would benefit from continued therapy after discharge     Pt presented to surgery on 21 for:                                                       1. L3-4 minimally invasive transforaminal lumbar interbody      fusion with posterior spinal fusion   2. Insertion of interbody cage for spinal fusion at L3-4.  3. Left L3 Rooney laminectomy for spinal decompression. 4. Insertion of posterior spinal instrumentation at L3-4      utilizing NuVasive Reline pedicle screws and rods. 5. [place] of local bone for spinal fusion. 6. Use of allograft bone for spinal fusion to include Osteocel,as well as crushed cancellous allograft bone secondary L3-4 spondylolisthesis with stenosis. RN reports patient is medically stable for therapy treatment this date. Chart reviewed prior to treatment and patient is agreeable for therapy. Assessment   Body structures, Functions, Activity limitations: Decreased functional mobility ; Decreased strength;Decreased safe awareness;Decreased endurance;Decreased balance;Decreased posture;Decreased ADL status  Assessment: Pt with deficits of bed mobility, transfers, ambulation, balance,  POOR safety awareness and endurance this session, & required 2 assist for safe transfers, unable to safely ambulate & required 2 assist with [place] to mobilize OOB. With current deficits, Pt HIGH risk for falls & requires continued PT to maximize independence with functional mobility, balance, safety awareness & activity tolerance. Pt is currently functional below baseline and would suggest intense therapy post acute care. Would expect patient to be able to tolerate 3 hours of therapy per day. Please refer to AM-PAC score for current mobility/adl level.   Prognosis: Excellent  Decision Making: High Complexity  Exam: ROM, MMT, functional mobility, activity tolerance, Balance, & [employer] AM-PAC 6 Clicks Basic Mobility  Clinical Presentation: evolving  PT Education: Goals;PT Role;Plan of Care;Transfer Training;Functional Mobility Training;General Safety  REQUIRES PT FOLLOW UP: Yes  Activity Tolerance  Activity Tolerance: Patient limited by fatigue;Patient limited by pain       Patient Diagnosis(es): The primary encounter diagnosis was Spondylolisthesis, acquired. A diagnosis of Chronic bilateral low back pain with left-sided sciatica was also pertinent to this visit. has a past medical history of Arthritis, Diabetes mellitus ([address] Diabetic retinopathy ([address] GERD (gastroesophageal reflux disease), Hypertension, and Polymyalgia ([facility] [address] has a past surgical history that includes  section; Colonoscopy; Endoscopy, colon, diagnostic; eye surgery; Finger trigger release; Cardiac surgery; and Lumbar spine surgery (Left, 2021).     Restrictions  Restrictions/Precautions  Restrictions/Precautions: General Precautions, Fall Risk  Required Braces or Orthoses?: Yes  Required Braces or Orthoses  Spinal: Lumbar Corset  Position Activity Restriction  Spinal Precautions: No Bending, No Lifting, No Twisting  Other position/activity restrictions: ambulate, activity as tolerated, pt to wear brace, telemetry, heels off bed at all times, turn/assist Q 2 hrs if unable to turn self  Vision/Hearing  Vision: Impaired  Vision Exceptions: Wears glasses at all times  Hearing: Within functional limits     Subjective  General  Chart Reviewed: Yes  Patient assessed for rehabilitation services?: Yes  Additional Pertinent Hx: DM2, diabetic retinopathy, GERD, HTN, polymyalgia  Response To Previous Treatment: Not applicable  General Comment  Comments: RN okays PT  Subjective  Subjective: Pt agreeable to PT  Pain Screening  Patient Currently in Pain: Yes  Pain Assessment  Pain Assessment: 0-10  Pain Type: Surgical treatment. All patient needs addressed prior to ending therapy session. Plan   Plan  Times per week: 1-2x/D,6-7D/week  Current Treatment Recommendations: Strengthening, Balance Training, Functional Mobility Training, Transfer Training, Endurance Training, Gait Training, Stair training, Home Exercise Program, Safety Education & Training, Patient/Caregiver Education & Training, ADL/Self-care Training, Positioning, Equipment Evaluation, Education, & procurement  Safety Devices  Type of devices: Bed alarm in place, Call light within reach, Chair alarm in place, Gait belt, Patient at risk for falls, Left in chair, Nurse notified    G-Code       OutComes Score                                                  AM-PAC Score  AM-PAC Inpatient Mobility Raw Score : 10 (04/27/21 1038)  AM-PAC Inpatient T-Scale Score : 32.29 (04/27/21 1038)  Mobility Inpatient CMS 0-100% Score: 76.75 (04/27/21 1038)  Mobility Inpatient CMS G-Code Modifier : CL (04/27/21 1038)          Goals  Short term goals  Time Frame for Short term goals: 12 visits  Short term goal 1: Inc bed mobility & transfers to indep  Short term goal 2: Inc gait to amb 400 ft or > indep w/ RW to enable pt to return to previous level of independence & promotion of home walking program  Short term goal 3: Pt able to go up/down 3 steps with supervision  Short term goal 4:  Inc standing balance to good with device to facilitate pt independence for performance of ADL's & functional mobility, & reduce fall risk  Short term goal 5: Pt able to tolerate 30-40 min of activity to include ex, NMR & functional mobility including 5 minutes of standing with device to facilitate activity tolerance to [employer]  Short term goal 6: Ed spinal precautions, donning & doffing of lumbar corsett brace, posture, energy conservation & safety principles, prevention sedentary complications & home walking program       Therapy Time   Individual Concurrent Group Co-treatment   Time In 1744 Time Out 1038         Minutes 58+10=68              Additional 10 minutes for chart review      Co-treatment with OT warranted secondary to decreased safety and independence requiring 2 skilled therapy professionals to address individual discipline's goals. PT addressing pre gait trunk strengthening, weight shifting prior to transfers, transfer training and postural control in sitting.       [address], PT

## 2021-04-27 NOTE — PLAN OF CARE
Problem: Skin Integrity:  Goal: Absence of new skin breakdown  Description: Absence of new skin breakdown  4/27/2021 0457 by Darryl Brown RN  Outcome: Ongoing  Note: Skin assessment completed and documented  Oliver scale updated  Relieve pressure to bony prominences  Avoid shearing  Assess s/sx of infection   Turns side to side  Turns self  Tiffanie care given and barrier cream applied to prevent breakdown  Heels elevated  Structural intactness and normal physiological function of skin and mucous membranes      Problem: Falls - Risk of:  Goal: Will remain free from falls  Description: Will remain free from falls  4/27/2021 0457 by Darryl Brown RN  Outcome: Ongoing  Note: Room free of clutter  Hourly rounding   Non-skid socks worn  Side rails up x2  Bed low and locked  Call light in reach  Instructed to call out before getting out of bed  Anticipatory needs met  Bed alarm on  Falling star at the door      Problem: Discharge Planning:  Goal: Discharged to appropriate level of care  Description: Discharged to appropriate level of care  Outcome: Ongoing  Note: Identify potential discharge needs/barriers on admission  Involve family/patient/significant other in discharge process  Collaborate with Case Management/ for discharge needs/concerns

## 2021-04-27 NOTE — PROGRESS NOTES
Occupational Therapy   Occupational Therapy Initial Assessment  Date: 2021   Patient Name: Juan Schafer  MRN: [id number]     : 1960    Date of Service: 2021    Discharge Recommendations: Pt is currently functional below baseline and would suggest intense therapy post acute care. Would expect patient to be able to tolerate 3 hours of therapy per day. Please refer to AM-PAC score for current mobility/adl level. Date of Procedure: 2021     Jennifer Lackey MD.     ANESTHESIA:  General endotracheal anesthesia.     PREOPERATIVE DIAGNOSIS:  1. L3-4 spondylolisthesis with stenosis.                                                            POSTOPERATIVE DIAGNOSIS: 1.  L3-4 spondylolisthesis with stenosis.                                                            PROCEDURES:  1. L3-4 minimally invasive transforaminal lumbar interbody      fusion with posterior spinal fusion   2. Insertion of interbody cage for spinal fusion at L3-4.  3. Left L3 Rooney laminectomy for spinal decompression. 4. Insertion of posterior spinal instrumentation at L3-4      utilizing NuVasive Reline pedicle screws and rods. 5. [place] of local bone for spinal fusion. 6. Use of allograft bone for spinal fusion to include Osteocel,       as well as crushed cancellous allograft bone. 7. Intraoperative use of C-arm fluoroscopy. RN reports patient is medically stable for therapy treatment this date. Chart reviewed prior to treatment and patient is agreeable for therapy. All lines intact and patient positioned comfortably at end of treatment. All patient needs addressed prior to ending therapy session. Assessment  Skilled OT is indicated to increase overall ROM, strength, balance, act mina as well as I/safety awareness in function to return home when appropriate with assist as needed. Performance deficits / Impairments: Decreased functional mobility ; Decreased ADL status; Decreased strength;Decreased safe awareness;Decreased posture;Decreased endurance;Decreased balance  Assessment: . Prognosis: Good  Decision Making: High Complexity  OT Education: OT Role;Plan of Care;Precautions; Home Exercise Program;ADL Adaptive Strategies;Transfer Training;Energy Conservation;Equipment; Family Education  REQUIRES OT FOLLOW UP: Yes  Activity Tolerance  Activity Tolerance: Patient Tolerated treatment well;Patient limited by pain  Safety Devices  Safety Devices in place: Yes  Type of devices: All fall risk precautions in place;Call light within reach; Chair alarm in place;Gait belt;Patient at risk for falls;Nurse notified; Left in chair           Patient Diagnosis(es): The primary encounter diagnosis was Spondylolisthesis, acquired. A diagnosis of Chronic bilateral low back pain with left-sided sciatica was also pertinent to this visit. has a past medical history of Arthritis, Diabetes mellitus ([facility] [address] Diabetic retinopathy ([facility] [address] GERD (gastroesophageal reflux disease), Hypertension, and Polymyalgia ([facility] [address] has a past surgical history that includes  section; Colonoscopy; Endoscopy, colon, diagnostic; eye surgery; Finger trigger release; Cardiac surgery; and Lumbar spine surgery (Left, 2021).            Restrictions  Restrictions/Precautions  Restrictions/Precautions: General Precautions, Fall Risk  Required Braces or Orthoses?: Yes  Required Braces or Orthoses  Spinal: Lumbar Corset  Position Activity Restriction  Spinal Precautions: No Bending, No Lifting, No Twisting  Other position/activity restrictions: ambulate, activity as tolerated, pt to wear brace, telemetry, heels off bed at all times, turn/assist Q 2 hrs if unable to turn self    Subjective   General  Chart Reviewed: Yes  Patient assessed for rehabilitation services?: Yes  Family / Caregiver Present: No  Pain Assessment  Pain Level: 9  Vital Signs  Temp: 98 °F (36.7 °C)  Temp Source: Oral  Pulse: 79  Heart Rate Source: Monitor  Resp: 16  BP: (!) 116/54  BP Location: Left Arm  MAP (mmHg): 68  Oxygen Therapy  SpO2: 95 %  O2 Device: None (Room air)  Social/Functional History  Social/Functional History  Lives With: Daughter  Type of Home: House  Home Layout: One level, Laundry in basement(12 steps with R descending rail to basement laundry)  Home Access: Stairs to enter without rails  Entrance Stairs - Number of Steps: 3  Bathroom Shower/Tub: Tub/Shower unit  Bathroom Toilet: Standard  Bathroom Equipment: Grab bars in shower  Home Equipment: Rolling walker  ADL Assistance: [address]: Independent  Homemaking Responsibilities: Yes  Ambulation Assistance: Independent(used R/W for a few weeks before surgery)  Transfer Assistance: Independent  Active : Yes  Mode of Transportation: Car  Occupation: Retired  Leisure & Hobbies: yardwork  Additional Comments: Pt denies falls. Pt has supportive daughter       Objective   Vision: Impaired  Vision Exceptions: Wears glasses at all times  Hearing: Within functional limits    Orientation  Overall Orientation Status: Within Functional Limits  Observation/Palpation  Posture: Poor  Observation: resting in bed, dressing low back  Balance  Sitting Balance: Contact guard assistance  Standing Balance: Maximum assistance(x2. Difficulty achieving fully upright position in Jeffrie Sacks d/t pain)  Functional Mobility  Functional Mobility Comments: Jason Sacks used for mob to/from bathroom. Pt unable to stand at RW this date or take steps  Toilet Transfers  Equipment Used: Standard toilet  Toilet Transfer: 2 Person assistance;Maximum assistance  Toilet Transfers Comments: Jeffrie Sacks  ADL  Feeding: Independent;Setup  Grooming: Stand by assistance;Setup;Minimal assistance  UE Bathing: Moderate assistance;Maximum assistance  LE Bathing: Maximum assistance  UE Dressing:  Moderate assistance;Maximum assistance  LE Dressing: Maximum assistance  Toileting: Maximum assistance(max A for hygiene following using toilet.)  Additional Comments: Pt needing [employer] with max A x 2 for toilet transfer and max A for all LB ADLs. Pt educated minimally on ADL techs/back precautions as pt is very limited by pain this session and requiring 2 person assist for transfers and standing. Pt will benefit from additional reinforcement of all education with further therapy warranted. Will benefit from training on ADL compensatory techs, long handled equip, EC/WS techs, and all precautions  Tone RUE  RUE Tone: Normotonic  Tone LUE  LUE Tone: Normotonic  Coordination  Movements Are Fluid And Coordinated: Yes     Bed mobility  Supine to Sit: Moderate assistance  Transfers  Sit to stand: 2 Person assistance;Maximum assistance  Stand to sit: 2 Person assistance;Maximum assistance  Transfer Comments: in [employer]. Max cues and assist to bring hips forward and chest up to achieve upright position in device. Pt is limited by pain     Cognition  Overall Cognitive Status: Exceptions  Arousal/Alertness: Appropriate responses to stimuli  Following Commands:  Follows all commands without difficulty  Attention Span: Appears intact  Memory: Appears intact  Safety Judgement: Decreased awareness of need for assistance;Decreased awareness of need for safety  Problem Solving: Decreased awareness of errors;Assistance required to identify errors made;Assistance required to correct errors made  Insights: Decreased awareness of deficits  Initiation: Does not require cues  Sequencing: Requires cues for some  Perception  Overall Perceptual Status: WFL     Sensation  Overall Sensation Status: WFL        LUE AROM (degrees)  LUE AROM : WFL  RUE AROM (degrees)  RUE AROM : WFL  LUE Strength  LUE Strength Comment: NT d/t pain                   Plan   Plan  Times per week: 4-5x/week, 1-2x/day  Current Treatment Recommendations: Strengthening, Balance Training, Functional Mobility Training, Endurance Training, Safety Education & Training, Self-Care / ADL, Pain Management, Positioning, Patient/Caregiver Education & Training, Equipment Evaluation, Education, & procurement       AM-PAC Inpatient Daily Activity Raw Score: 15 (04/27/21 1235)  AM-PAC Inpatient ADL T-Scale Score : 34.69 (04/27/21 1235)  ADL Inpatient CMS 0-100% Score: 56.46 (04/27/21 1235)  ADL Inpatient CMS G-Code Modifier : CK (04/27/21 1235)    Goals  Short term goals  Time Frame for Short term goals: by discharge, pt will  Short term goal 1: demo CGA with ADL transfers with good safety and AD/DME as approp  Short term goal 2: demo CGA with functional mob with approp AD for ADL completion with good safety/pacing  Short term goal 3: demo and verb good understanding of ed provided on fall prevention techs, EC/WS techs, equip needs, back precautions, and ADL compensatory techs  Short term goal 4: demo CGA with toileting routine with good safety  Short term goal 5: demo min A with UB/LB ADLs with DME/AE as needed and good safety/pacing with good understanding of precautions  Patient Goals   Patient goals : to have pain controlled       Therapy Time   Individual Concurrent Group Co-treatment   Time In 1005         Time Out 1038         Minutes 33          treatment min:23  Co-treatment with PT warranted secondary to decreased safety and independence requiring 2 skilled therapy professionals to address individual discipline's goals. OT addressing preparation for ADL transfer, sitting balance for increased ADL performance, sitting/activity tolerance, functional reaching, environmental safety/scanning, fall prevention, functional mobility for ADL transfers and functional UE strength.        Yuriy Ast, OT

## 2021-04-27 NOTE — PROGRESS NOTES
[facility] Ortho [facility]ine  Attending Progress Note  4/27/2021  7:56 AM     Lola Santos    1960   [id number]      SUBJECTIVE:  Doing ok. Moderate pain which is improving. She has not been walking yet. Tried to stand yesterday but too painful. Denies leg symptoms. Ba out. No CP/SOB    OBJECTIVE      Physical      VITALS:  BP (!) 124/51   Pulse 82   Temp 98.6 °F (37 °C) (Oral)   Resp 16   Ht 5' (1.524 m)   Wt 180 lb (81.6 kg)   LMP 04/09/2016   SpO2 91%   BMI 35.15 kg/m²     Dressing C/D/I    NEUROLOGIC: Alert and Oriented x 3. Strength 5/5 HF, 5/5 Q, 5/5 TA, 5/5 EHL, 5/5 GS. 5/5 D, 5/5 B, 5/5 T, 5/5 WE, 5/5 WF, 5/5 I                                                                  Sensation intact.      Data  CBC:   Lab Results   Component Value Date    WBC 12.0 04/27/2021    RBC 3.40 04/27/2021    HGB 10.4 04/27/2021    HCT 32.5 04/27/2021    MCV 95.6 04/27/2021    MCH 30.6 04/27/2021    MCHC 32.0 04/27/2021    RDW 13.3 04/27/2021     04/27/2021    MPV 10.2 04/27/2021     BMP:    Lab Results   Component Value Date     04/27/2021    K 4.5 04/27/2021    CL 99 04/27/2021    CO2 25 04/27/2021    BUN 13 04/27/2021    CREATININE 0.66 04/27/2021    CALCIUM 8.7 04/27/2021    GFRAA >60 04/27/2021    LABGLOM >60 04/27/2021    GLUCOSE 314 04/27/2021           Current Inpatient Medications    Current Facility-Administered Medications: benzocaine-menthol (CEPACOL SORE THROAT) lozenge 1 lozenge, 1 lozenge, Oral, Q2H PRN  ondansetron (ZOFRAN) injection 4 mg, 4 mg, Intravenous, Q6H PRN  carvedilol (COREG) tablet 3.125 mg, 3.125 mg, Oral, BID WC  furosemide (LASIX) tablet 40 mg, 40 mg, Oral, BID  gabapentin (NEURONTIN) capsule 100 mg, 100 mg, Oral, TID  insulin glargine (LANTUS) injection vial 25 Units, 25 Units, Subcutaneous, QAM AC  losartan (COZAAR) tablet 50 mg, 50 mg, Oral, Daily  glucose (GLUTOSE) 40 % oral gel 15 g, 15 g, Oral, PRN  dextrose 50 % IV solution, 12.5 g, Intravenous, PRN  glucagon (rDNA) injection 1 mg, 1 mg, Intramuscular, PRN  dextrose 5 % solution, 100 mL/hr, Intravenous, PRN  0.9 % sodium chloride infusion, , Intravenous, Continuous  sodium chloride flush 0.9 % injection 10 mL, 10 mL, Intravenous, 2 times per day  sodium chloride flush 0.9 % injection 10 mL, 10 mL, Intravenous, PRN  0.9 % sodium chloride infusion, 25 mL, Intravenous, PRN  morphine (PF) injection 2 mg, 2 mg, Intravenous, Q2H PRN **OR** morphine sulfate (PF) injection 4 mg, 4 mg, Intravenous, Q2H PRN  polyethylene glycol (GLYCOLAX) packet 17 g, 17 g, Oral, Daily  sennosides-docusate sodium (SENOKOT-S) 8.6-50 MG tablet 1 tablet, 1 tablet, Oral, BID  insulin lispro (HUMALOG) injection vial 0-12 Units, 0-12 Units, Subcutaneous, TID WC  insulin lispro (HUMALOG) injection vial 0-6 Units, 0-6 Units, Subcutaneous, Nightly  oxyCODONE-acetaminophen (PERCOCET) 5-325 MG per tablet 1 tablet, 1 tablet, Oral, Q4H PRN **OR** oxyCODONE-acetaminophen (PERCOCET) 5-325 MG per tablet 2 tablet, 2 tablet, Oral, Q4H PRN  morphine (MS CONTIN) extended release tablet 15 mg, 15 mg, Oral, 2 times per day  tiZANidine (ZANAFLEX) tablet 4 mg, 4 mg, Oral, Q8H PRN  magnesium oxide (MAG-OX) tablet 400 mg, 400 mg, Oral, Daily    ASSESSMENT AND PLAN    57 y.o. female status post L3-4 MIS TLIF/PSFI  post op day #  1    1. PT- WBAT  2. Pain control  3. EPC  4. D/C plan for home later today if pain controlled, voiding well, and walking. If not, ok to hold d/c until tomorrow.   5. Has walker for home    Kathryn Gonzalez MD  [facility]  Spine Surgeon  [phone]

## 2021-04-27 NOTE — CONSULTS
[facility]  Office: [address], DO, Chay Castellon, DO, Yovani Rose, DO, Murphy Delgadillo Blood, DO, Dilma Iverson MD, Maribell Syed MD, Ephraim Reyna MD, Costa Landrum MD, Mackenzie Fuentes MD, Zo Mcgee MD, Sandra Candelaria MD, Jatin Zacarias MD, Radha Palacios, DO, Ruby Nielsen MD, Roslyn Tapia, DO, Shyam Gomez MD,  Brady Jaquez DO, Jann Castillo MD, Og Hdz MD, Tamera Xiao MD, Haider Mejia MD, Laura Vazquez, CNP, [facility] Yandel, CNP, April Mathews, CNP, Emerita Tompkins, CNS, Damian Krishnamurthy, CNP, Luana Lama, CNP, Aren Grajeda, CNP, Ashanti To, CNP, Lorenzo Ramsey, CNP, Salma Eddy PASevenC, Radhames Quiñones, DNP, Mark Lovelace, CNP, Oliver Prose, CNP, Fina Mackinaw, CNP, Peter Tidwell, CNP, Candido Pradhan, CNP, Lauren Dasilva, CNP         [facility] / HISTORY AND PHYSICAL EXAMINATION            Date:   4/27/2021  Patient name:  Frederick Godoy  Date of admission:  4/26/2021  8:58 AM  MRN:   [id number]  Account:  [de-identified]  YOB: 1960  PCP:    Von Leyden, MD  Room:   2015/2015-01  Code Status:    Full Code    Physician Requesting Consult: Jose Estevez MD    Reason for Consult: Postoperative medical management of hypertension, diabetes and general medical care at request of Dr. Dicie Dance    Chief Complaint:     Back pain with neurogenic claudication    History Obtained From:     patient    History of Present Illness: This is a 69-year-old female that presents with back pain and claudication symptoms. Symptoms progressive worsening since this past October. She was initially evaluated by Dr. Forman Mode her vascular surgeon and was cleared from his aspect for any vascular disease to cause claudication. Ultimately she had further testing done and was found to have spinal stenosis of the lumbar region and neurogenic claudication.   She has failed conservative measures and is admitted this time for surgical decompression. Were asked to assist in her postoperative medical management primary of hypertension and diabetes. She reports longstanding history of hypertension which is well controlled with current medications. She reports a 20-year history of type 2 diabetes is now insulin-dependent. She denies any diabetic complications other than retinopathy, she denied any neuropathy, nephropathy or other chronic issues related to her diabetes. She reports her blood sugars are fairly well controlled on her current medications but have been running higher recently due to decreased activity associate with her pain. Past Medical History:     Past Medical History:   Diagnosis Date    Arthritis     Diabetes mellitus ([address]     Diabetic retinopathy ([address]     GERD (gastroesophageal reflux disease)     Hypertension     Polymyalgia (HCC)         Past Surgical History:     Past Surgical History:   Procedure Laterality Date    CARDIAC SURGERY       negative 2006     SECTION      COLONOSCOPY      ENDOSCOPY, COLON, DIAGNOSTIC      EYE SURGERY      injections     FINGER TRIGGER RELEASE      LUMBAR SPINE SURGERY Left 2021    LEFT L3-4 TLIF  - NUVASIVE, 2 CARMS performed by Nhung Ann MD at [facility]        Medications Prior to Admission:     Prior to Admission medications    Medication Sig Start Date End Date Taking? Authorizing Provider   morphine (MS CONTIN) 15 MG extended release tablet Take 1 tablet by mouth every 12 hours for 3 days. 21 Yes Nhung Ann MD   oxyCODONE-acetaminophen (PERCOCET) 5-325 MG per tablet Take 1-2 tablets by mouth every 4 hours as needed for Pain for up to 7 days.  21 Yes Nhung Ann MD   sennosides-docusate sodium (SENOKOT-S) 8.6-50 MG tablet Take 1 tablet by mouth 2 times daily 21  Yes Nhung Ann MD   tiZANidine (ZANAFLEX) 4 MG tablet Take 1 tablet by mouth every 8 hours as needed (spasm) 21  Yes Nithya Bolanos Tex Gentile MD   insulin glargine (LANTUS) 100 UNIT/ML injection vial Inject 25 Units into the skin every morning (before breakfast)   Yes Historical Provider, MD   insulin lispro (HUMALOG) 100 UNIT/ML injection cartridge Inject 6 Units into the skin as needed (sliding scale will vary on dose taken) Sliding scale    Yes Historical Provider, MD   carvedilol (COREG) 3.125 MG tablet 1 tablet with food   Yes Historical Provider, MD   gabapentin (NEURONTIN) 100 MG capsule 1 capsule   Yes Historical Provider, MD   losartan (COZAAR) 50 MG tablet 1 tablet   Yes Historical Provider, MD   Multiple Vitamin (MULTIVITAMIN ADULT PO) Take 1 tablet by mouth daily   Yes Historical Provider, MD   Cholecalciferol (VITAMIN D3) 355316 UNIT/GM POWD 2 tablets   Yes Historical Provider, MD   nystatin (MYCOSTATIN) 445238 UNIT/GM powder Apply topically 4 times daily 3/24/21  Yes Historical Provider, MD   furosemide (LASIX) 40 MG tablet Take 40 mg by mouth 2 times daily   Yes Historical Provider, MD   zinc gluconate 50 MG tablet Take 50 mg by mouth daily   Yes Historical Provider, MD   magnesium oxide (MAG-OX) 400 MG tablet Take 400 mg by mouth daily   Yes Historical Provider, MD   calcium carbonate (OSCAL) 500 MG TABS tablet Take 500 mg by mouth daily   Yes Historical Provider, MD   Ascorbic Acid (VITAMIN C) 250 MG tablet Take 250 mg by mouth daily   Yes Historical Provider, MD   Flaxseed, Linseed, (FLAX SEEDS PO) Take by mouth   Yes Historical Provider, MD   Milk Thistle 1000 MG CAPS Take by mouth   Yes Historical Provider, MD   LYSINE PO Take by mouth   Yes Historical Provider, MD   Multiple Vitamins-Minerals (THERAPEUTIC MULTIVITAMIN-MINERALS) tablet Take 1 tablet by mouth daily   Yes Historical Provider, MD        Allergies:     Latex, Pcn [penicillins], Naproxen, Pioglitazone, Pramlintide, Sulfa antibiotics, Pramlintide acetate, and Sulfamethoxazole-trimethoprim    Social History:     Tobacco:    reports that she has quit smoking.  She has never used smokeless tobacco.  Alcohol:      reports previous alcohol use. Drug Use:  reports no history of drug use. Family History:     Family History   Problem Relation Age of Onset    Hypertension Mother     Asthma Mother     Arthritis Mother     Diabetes Father        Review of Systems:     Positive and Negative as described in HPI. CONSTITUTIONAL:  negative for fevers, chills, sweats, fatigue, weight loss  HEENT:  negative for vision, hearing changes, runny nose, throat pain  RESPIRATORY:  negative for shortness of breath, cough, congestion, wheezing. CARDIOVASCULAR:  negative for chest pain, palpitations. GASTROINTESTINAL:  negative for nausea, vomiting, diarrhea, constipation, change in bowel habits, abdominal pain   GENITOURINARY:  negative for difficulty of urination, burning with urination, frequency   INTEGUMENT:  negative for rash, skin lesions, easy bruising   HEMATOLOGIC/LYMPHATIC:  negative for swelling/edema   ALLERGIC/IMMUNOLOGIC:  negative for urticaria , itching  ENDOCRINE:  negative increase in drinking, increase in urination, hot or cold intolerance  MUSCULOSKELETAL: Positive for back and joint pains, negative for muscle aches, swelling of joints  NEUROLOGICAL:  negative for headaches, dizziness, lightheadedness, numbness, positive for lower extremity pain consistent with neurogenic claudication, tingling extremities  BEHAVIOR/PSYCH:  negative for depression, anxiety    Physical Exam:     BP (!) 116/54   Pulse 79   Temp 98 °F (36.7 °C) (Oral)   Resp 16   Ht 5' (1.524 m)   Wt 180 lb (81.6 kg)   LMP 2016   SpO2 95%   BMI 35.15 kg/m²   Temp (24hrs), Av °F (36.7 °C), Min:97.1 °F (36.2 °C), Max:98.6 °F (37 °C)    Recent Labs     21  2150 21  2312 21  0630 21  1120   POCGLU 196* 194* 280* 237*       Intake/Output Summary (Last 24 hours) at 2021 1454  Last data filed at 2021 0609  Gross per 24 hour   Intake 2088. 5 ml   Output 620 ml Net 1468.5 ml       General Appearance:  alert, well appearing, and in no acute distress, patient is laying still in bed and reluctant to move due to back pain. Mental status: oriented to person, place, and time with normal affect  Head:  normocephalic, atraumatic. Eye: no icterus, redness, pupils equal and reactive, extraocular eye movements intact, conjunctiva clear  Ear: normal external ear, no discharge, hearing intact  Nose:  no drainage noted  Mouth: mucous membranes moist  Neck: supple, no carotid bruits, thyroid not palpable  Lungs: Bilateral equal air entry, clear to ausculation, no wheezing, rales or rhonchi, normal effort  Cardiovascular: normal rate, regular rhythm, no murmur, gallop, rub.   Abdomen: Soft, nontender, nondistended, normal bowel sounds, no hepatomegaly or splenomegaly  Neurologic: There are no new focal motor or sensory deficits, normal muscle tone and bulk, no abnormal sensation, normal speech, cranial nerves II through XII grossly intact  Skin: No gross lesions, rashes, bruising or bleeding on exposed skin area  Extremities:  peripheral pulses palpable, no pedal edema or calf pain with palpation  Psych: normal affect     Investigations:      Laboratory Testing:  Recent Results (from the past 24 hour(s))   POC Glucose Fingerstick    Collection Time: 04/26/21  8:26 PM   Result Value Ref Range    POC Glucose 188 (H) 65 - 105 mg/dL   POC Glucose Fingerstick    Collection Time: 04/26/21  9:50 PM   Result Value Ref Range    POC Glucose 196 (H) 65 - 105 mg/dL   POC Glucose Fingerstick    Collection Time: 04/26/21 11:12 PM   Result Value Ref Range    POC Glucose 194 (H) 65 - 105 mg/dL   POC Glucose Fingerstick    Collection Time: 04/27/21  6:30 AM   Result Value Ref Range    POC Glucose 280 (H) 65 - 105 mg/dL   Basic Metabolic Panel    Collection Time: 04/27/21  6:32 AM   Result Value Ref Range    Glucose 314 (H) 70 - 99 mg/dL    BUN 13 8 - 23 mg/dL    CREATININE 0.66 0.50 - 0.90 mg/dL Bun/Cre Ratio 20 9 - 20    Calcium 8.7 8.6 - 10.4 mg/dL    Sodium 135 135 - 144 mmol/L    Potassium 4.5 3.7 - 5.3 mmol/L    Chloride 99 98 - 107 mmol/L    CO2 25 20 - 31 mmol/L    Anion Gap 11 9 - 17 mmol/L    GFR Non-African American >60 >60 mL/min    GFR African American >60 >60 mL/min    GFR Comment          GFR Staging NOT REPORTED    CBC    Collection Time: 04/27/21  6:32 AM   Result Value Ref Range    WBC 12.0 (H) 3.5 - 11.3 k/uL    RBC 3.40 (L) 3.95 - 5.11 m/uL    Hemoglobin 10.4 (L) 11.9 - 15.1 g/dL    Hematocrit 32.5 (L) 36.3 - 47.1 %    MCV 95.6 82.6 - 102.9 fL    MCH 30.6 25.2 - 33.5 pg    MCHC 32.0 28.4 - 34.8 g/dL    RDW 13.3 11.8 - 14.4 %    Platelets 824 897 - 820 k/uL    MPV 10.2 8.1 - 13.5 fL    NRBC Automated 0.0 0.0 per 100 WBC   Hemoglobin A1C    Collection Time: 04/27/21  6:32 AM   Result Value Ref Range    Hemoglobin A1C 9.4 (H) 4.0 - 6.0 %    Estimated Avg Glucose 223 mg/dL   POC Glucose Fingerstick    Collection Time: 04/27/21 11:20 AM   Result Value Ref Range    POC Glucose 237 (H) 65 - 105 mg/dL       Imaging/Diagonstics:  No results found. Assessment :      Hospital Problems           Last Modified POA    * (Principal) Spondylolisthesis, acquired (Chronic) 4/26/2021 Yes    Spinal stenosis, lumbar region with neurogenic claudication (Chronic) 4/26/2021 Yes    Type 2 diabetes mellitus with both eyes affected by mild nonproliferative retinopathy without macular edema, with long-term current use of insulin ([address] 4/27/2021 Yes    Essential hypertension 4/27/2021 Yes    Diabetic retinopathy ([address] 4/27/2021 Yes    Postoperative anemia due to acute blood loss 4/27/2021 Yes          Plan:     1. Admit inpatient per orthopedics  2. Continue efforts for pain control, orthopedics managing. 3. Insulin scale as ordered, monitor glucose. Continue home basal insulin. 4. Home antihypertensives as ordered, titrate as needed  5. GI and DVT prophylaxis  6.  PT and OT, further recommendations and plans

## 2021-04-27 NOTE — PROGRESS NOTES
Patient stood at bedside with the assistance of her walker. She tolerated this poorly as she could barely stand. She left her back brace at home and said her daughter will bring it in this morning hopefully before PT. Writer will continue to monitor.

## 2021-04-27 NOTE — OP NOTE
Operative Note      Patient: Yelitza Pineda  YOB: 1960  MRN: [id number]    Date of Procedure: 4/26/2021    SURGEON:  Teagan Guevara MD.     ANESTHESIA:  General endotracheal anesthesia. PREOPERATIVE DIAGNOSIS:  1. L3-4 spondylolisthesis with stenosis. POSTOPERATIVE DIAGNOSIS: 1. L3-4 spondylolisthesis with stenosis. PROCEDURES:  1. L3-4 minimally invasive transforaminal lumbar interbody      fusion with posterior spinal fusion   2. Insertion of interbody cage for spinal fusion at L3-4.  3. Left L3 Rooney laminectomy for spinal decompression. 4. Insertion of posterior spinal instrumentation at L3-4      utilizing NuVasive Reline pedicle screws and rods. 5. [place] of local bone for spinal fusion. 6. Use of allograft bone for spinal fusion to include Osteocel,       as well as crushed cancellous allograft bone. 7. Intraoperative use of C-arm fluoroscopy. ESTIMATED BLOOD LOSS:  200 mL. FLUIDS:  Per Anesthesia record. COMPLICATIONS:  None. INDICATIONS:  This is a pleasant 57year-old female with a  longstanding history of severe back pain, as well as pain  radiating down her left lower extremity. She had done extensive  conservative management to include physical therapy, medications,  pain management, all with minimal benefit. MRI did show  significant spondylolisthesis and stenosis at the L3-4 level  particularly on the left consistent with her symptoms. Due to  her failure of conservative management, it was discussed with her  the option of performing a decompression and fusion in an attempt  to help alleviate her symptoms.   Risks were discussed including  bleeding, infection, injury to nerves, vessels, anesthetic risk,  need for possible further future surgery, as well as the  possibility for continued pain and continued symptoms, as well as  possible dural tear and this bone was again  also harvested. A Kerr[place] was then used to remove further bone  as well as the ligamentum flavum and expose the underlying dura,  as well as the exiting and traversing nerve roots, which were  completely freed at this point in time. Once this was done, the  disk space was then incised. Disk was then removed partially. Sizers were then utilized and it was found that a 10 x 10 x 30 mm  4-degree cage would be appropriate. The disk space prep was then  completed and bone graft was then placed into the disk space  until fully packed with bone utilizing the Osteocel  and the  local bone, which was previously harvested. Once this was fully  packed with bone and the cage was also packed with the same bone  mixture, the cage was then inserted under C-arm guidance into the  L3-4 disk space until it was fully seated. Once this was done,   was then removed. Normal saline with bacitracin was  used to irrigate the wound. Gel-Foam with thrombin, neuro  patties, and FloSeal were used to maintain epidural hemostasis. At this point, posterolateral dissection was then performed  exposing the transverse processes of L3 and L4. This area was then burred. The remainder of the bone graft was  then placed in the posterolateral gutter for posterolateral  fusion from L3-4. Once this was done, the shims were then  removed from the screw shanks. Screw heads were then placed on  the screw shanks and the screws were then fully seated with the  screwdriver. Orlin was then measured and placed into the heads of  the screws and set screws were then placed and subsequently final  tightened. At this point, the Renny Mcbride was used to verify that  the decompression was complete and the retractor was then  removed. At this point, the orlin was measured for the right side.   It was placed on a percutaneous  and placed through the  tower devices and set screws were then placed and subsequently  final

## 2021-04-27 NOTE — PROGRESS NOTES
section; Colonoscopy; Endoscopy, colon, diagnostic; eye surgery; Finger trigger release; Cardiac surgery; and Lumbar spine surgery (Left, 4/26/2021). Restrictions  Restrictions/Precautions  Restrictions/Precautions: General Precautions, Fall Risk  Required Braces or Orthoses?: Yes  Required Braces or Orthoses  Spinal: Lumbar Corset  Position Activity Restriction  Spinal Precautions: No Bending, No Lifting, No Twisting  Other position/activity restrictions: ambulate, activity as tolerated, pt to wear brace, telemetry, heels off bed at all times, turn/assist Q 2 hrs if unable to turn self  Subjective   General  Chart Reviewed: Yes  Additional Pertinent Hx: DM2, diabetic retinopathy, GERD, HTN, polymyalgia  Response To Previous Treatment: Patient with no complaints from previous session. Subjective  Subjective: Pt agreeable to pT  General Comment  Comments: RN okays PT  Pain Screening  Patient Currently in Pain: Yes  Pain Assessment  Pain Assessment: 0-10  Pain Type: Surgical pain  Pain Location: Back  Pain Orientation: Mid;Lower  Response to Pain Intervention: Patient Satisfied  Vital Signs  BP Location: Left Arm  Level of Consciousness: Alert (0)  Patient Currently in Pain: Yes  Oxygen Therapy  O2 Device: None (Room air)       Orientation  Orientation  Overall Orientation Status: Within Functional Limits  Cognition   Cognition  Overall Cognitive Status: Exceptions  Arousal/Alertness: Appropriate responses to stimuli  Following Commands:  Follows all commands without difficulty  Attention Span: Appears intact  Memory: Appears intact  Safety Judgement: Decreased awareness of need for assistance;Decreased awareness of need for safety  Problem Solving: Decreased awareness of errors;Assistance required to identify errors made;Assistance required to correct errors made  Insights: Decreased awareness of deficits  Initiation: Does not require cues  Sequencing: Requires cues for some  Objective   Bed mobility  Rolling to Left: Moderate assistance;2 Person assistance  Rolling to Right: Moderate assistance;2 Person assistance  Supine to Sit: Moderate assistance  Sit to Supine: Maximum assistance;2 Person assistance  Scooting: Maximal assistance;2 Person assistance  Comment: Max verbal instruction/tactile assist for UE hand placement on rail and proper log rolling technique + support of LE's to come in bed with increased time needed. Transfers  Sit to Stand: Maximum Assistance;2 Person Assistance  Stand to sit: Maximum Assistance;2 Person Assistance  Bed to Chair: Dependent/Total  Stand Pivot Transfers: Dependent/Total  Lateral Transfers: Dependent/Total  Comment: Max Ed & tactile assist on correct use of upper body for safe sit/stand. Placed Deneen Founds, stood into device with 2max assist, mobilized to BR & sat from device with 2max assist. Pt stood with 2 max Assistance,stood 3 minutes with assist to complete preicare & then mobilized back to bed from BR via [place] Founds, & sat to EOB with 2 maxA  Ambulation  Ambulation?: Yes  Ambulation 1  Surface: level tile  Assistance: Maximum assistance;2 Person assistance  Quality of Gait: could only partial stand & not stable to initiate any ambulation     Balance  Sitting - Static: Good  Sitting - Dynamic: Good  Standing - Static: Poor  Standing - Dynamic: (max2)  Exercises  Comments: Ed safe functional mobility, spinal precautions, safety & energy principles, circulation ex's & prevention of sedentary complications    All lines intact, call light within reach, and patient positioned comfortably at end of treatment. All patient needs addressed prior to ending therapy session.                        G-Code     OutComes Score                                                     AM-PAC Score  AM-PAC Inpatient Mobility Raw Score : 10 (04/27/21 1038)  AM-PAC Inpatient T-Scale Score : 32.29 (04/27/21 1038)  Mobility Inpatient CMS 0-100% Score: 76.75 (04/27/21 1038)  Mobility Inpatient CMS G-Code Modifier : CL (04/27/21 1038)          Goals  Short term goals  Time Frame for Short term goals: 12 visits  Short term goal 1: Inc bed mobility & transfers to indep  Short term goal 2: Inc gait to amb 400 ft or > indep w/ RW to enable pt to return to previous level of independence & promotion of home walking program  Short term goal 3: Pt able to go up/down 3 steps with supervision  Short term goal 4:  Inc standing balance to good with device to facilitate pt independence for performance of ADL's & functional mobility, & reduce fall risk  Short term goal 5: Pt able to tolerate 30-40 min of activity to include ex, NMR & functional mobility including 5 minutes of standing with device to facilitate activity tolerance to [facility]  Short term goal 6: Ed spinal precautions, donning & doffing of lumbar corsett brace, posture, energy conservation & safety principles, prevention sedentary complications & home walking program    Plan    Plan  Times per week: 1-2x/D,6-7D/week  Current Treatment Recommendations: Strengthening, Balance Training, Functional Mobility Training, Transfer Training, Endurance Training, Gait Training, Stair training, Home Exercise Program, Safety Education & Training, Patient/Caregiver Education & Training, ADL/Self-care Training, Positioning, Equipment Evaluation, Education, & procurement  Safety Devices  Type of devices: Bed alarm in place, Call light within reach, Gait belt, Patient at risk for falls, Nurse notified, Left in bed     Therapy Time   Individual Concurrent Group Co-treatment   Time In 1250         Time Out 1322         Minutes [address], PT

## 2021-04-27 NOTE — FLOWSHEET NOTE
Pt receiving care at bedside during attempted visit. Pt has d/c order. Please notify spiritual care with any spiritual/emotional concerns if needed. 04/27/21 1602   Encounter Summary   Services provided to: Patient not available   Referral/Consult From: [address] [place] [address] Parent; Children   Continue Visiting   (4/27/21 not available)   Routine   Type Initial       Electronically signed by Diego Valderrama, on 4/27/2021 at 4:09 PM.  [place]  [phone]

## 2021-04-27 NOTE — PLAN OF CARE
Problem: Pain:  Description: Pain management should include both nonpharmacologic and pharmacologic interventions. Goal: Pain level will decrease  Description: Pain level will decrease  4/27/2021 1240 by Jameel Steward RN  Outcome: Ongoing  Note: Pain level assessment complete. Patient educated on pain scale and control interventions  PRN pain medication given per patient request  Patient instructed to call out with new onset of pain or unrelieved pain    4/27/2021 0457 by Vianca Hutson RN  Outcome: Ongoing  Goal: Control of acute pain  Description: Control of acute pain  4/27/2021 1240 by Jameel Steward RN  Outcome: Ongoing  4/27/2021 0457 by Vianca Hutson RN  Outcome: Ongoing  Goal: Control of chronic pain  Description: Control of chronic pain  4/27/2021 1240 by Jameel Steward RN  Outcome: Ongoing  4/27/2021 0457 by Vianca Hutson RN  Outcome: Ongoing     Problem: Skin Integrity:  Goal: Will show no infection signs and symptoms  Description: Will show no infection signs and symptoms  4/27/2021 1240 by Jameel Steward RN  Outcome: Ongoing  4/27/2021 0457 by Vianca Hutson RN  Outcome: Ongoing  Goal: Absence of new skin breakdown  Description: Absence of new skin breakdown  4/27/2021 1240 by Jameel Steward RN  Outcome: Ongoing  4/27/2021 0457 by Vianca Hutson RN  Outcome: Ongoing  Note: Skin assessment completed and documented  Oliver scale updated  Relieve pressure to bony prominences  Avoid shearing  Assess s/sx of infection   Turns side to side  Turns self  Tiffanie care given and barrier cream applied to prevent breakdown  Heels elevated  Structural intactness and normal physiological function of skin and mucous membranes      Problem: Falls - Risk of:  Goal: Will remain free from falls  Description: Will remain free from falls  4/27/2021 1240 by Jameel Steward RN  Outcome: Ongoing  Note: Siderails up x 2  Hourly rounding  Call light in reach  Instructed to call for assist before attempting out of bed.   Remains free from falls and accidental injury at this time   Floor free from obstacles  Bed is locked and in lowest position  Adequate lighting provided  Bed alarm on, [place] star and Stay with Me signs posted      4/27/2021 0457 by Moni Lundy RN  Outcome: Ongoing  Note: Room free of clutter  Hourly rounding   Non-skid socks worn  Side rails up x2  Bed low and locked  Call light in reach  Instructed to call out before getting out of bed  Anticipatory needs met  Bed alarm on  Falling star at the door   Goal: Absence of physical injury  Description: Absence of physical injury  4/27/2021 1240 by Lorie Alvarez RN  Outcome: Ongoing  4/27/2021 0457 by Moni Lundy RN  Outcome: Ongoing     Problem: SAFETY  Goal: Free from accidental physical injury  4/27/2021 1240 by Lorie Alvarez RN  Outcome: Ongoing  4/27/2021 0457 by Moni Lundy RN  Outcome: Ongoing  Goal: Free from intentional harm  4/27/2021 1240 by Lorie Alvarez RN  Outcome: Ongoing  4/27/2021 0457 by Moni Lundy RN  Outcome: Ongoing     Problem: DAILY CARE  Goal: Daily care needs are met  4/27/2021 1240 by Lorie Alvarez RN  Outcome: Ongoing  4/27/2021 0457 by Moni Lundy RN  Outcome: Ongoing     Problem: PAIN  Goal: Patient's pain/discomfort is manageable  4/27/2021 1240 by Lorie Alvarez RN  Outcome: Ongoing  4/27/2021 0457 by Moni Lundy RN  Outcome: Ongoing     Problem: SKIN INTEGRITY  Goal: Skin integrity is maintained or improved  4/27/2021 1240 by Lorie Alvarez RN  Outcome: Ongoing  4/27/2021 0457 by Moni Lundy RN  Outcome: Ongoing     Problem: KNOWLEDGE DEFICIT  Goal: Patient/S.O. demonstrates understanding of disease process, treatment plan, medications, and discharge instructions.   4/27/2021 1240 by Lorie Alvarez RN  Outcome: Ongoing  4/27/2021 0457 by Moni Lundy RN  Outcome: Ongoing     Problem: DISCHARGE BARRIERS  Goal: Patient's continuum of care needs are met  4/27/2021 1240 by Lorie Alvarez RN  Outcome: Ongoing  4/27/2021 0457 by Dot Blake RN  Outcome: Ongoing     Problem: Discharge Planning:  Goal: Discharged to appropriate level of care  Description: Discharged to appropriate level of care  4/27/2021 1240 by Jessica Tan RN  Outcome: Ongoing  4/27/2021 0457 by Dot Blake RN  Outcome: Ongoing  Note: Identify potential discharge needs/barriers on admission  Involve family/patient/significant other in discharge process  Collaborate with Case Management/ for discharge needs/concerns      Problem: Mobility - Impaired:  Goal: Mobility will improve  Description: Mobility will improve  4/27/2021 1240 by Jessica Tan RN  Outcome: Ongoing  4/27/2021 0457 by Dot Blake RN  Outcome: Ongoing     Problem: Infection - Surgical Site:  Goal: Will show no infection signs and symptoms  Description: Will show no infection signs and symptoms  4/27/2021 1240 by Jessica Tan RN  Outcome: Ongoing  4/27/2021 0457 by Dot Blake RN  Outcome: Ongoing     Problem: IP BALANCE  Goal: LTG - Patient will maintain balance to allow for safe/functional mobility  Outcome: Ongoing

## 2021-04-28 VITALS
SYSTOLIC BLOOD PRESSURE: 111 MMHG | OXYGEN SATURATION: 93 % | DIASTOLIC BLOOD PRESSURE: 43 MMHG | RESPIRATION RATE: 16 BRPM | WEIGHT: 198 LBS | BODY MASS INDEX: 38.87 KG/M2 | TEMPERATURE: 98.4 F | HEIGHT: 60 IN | HEART RATE: 81 BPM

## 2021-04-28 LAB
ANION GAP SERPL CALCULATED.3IONS-SCNC: 11 MMOL/L (ref 9–17)
BUN BLDV-MCNC: 12 MG/DL (ref 8–23)
BUN/CREAT BLD: 18 (ref 9–20)
CALCIUM SERPL-MCNC: 8.6 MG/DL (ref 8.6–10.4)
CHLORIDE BLD-SCNC: 98 MMOL/L (ref 98–107)
CO2: 24 MMOL/L (ref 20–31)
CREAT SERPL-MCNC: 0.65 MG/DL (ref 0.5–0.9)
GFR AFRICAN AMERICAN: >60 ML/MIN
GFR NON-AFRICAN AMERICAN: >60 ML/MIN
GFR SERPL CREATININE-BSD FRML MDRD: ABNORMAL ML/MIN/{1.73_M2}
GFR SERPL CREATININE-BSD FRML MDRD: ABNORMAL ML/MIN/{1.73_M2}
GLUCOSE BLD-MCNC: 222 MG/DL (ref 65–105)
GLUCOSE BLD-MCNC: 255 MG/DL (ref 70–99)
GLUCOSE BLD-MCNC: 261 MG/DL (ref 65–105)
HCT VFR BLD CALC: 31.6 % (ref 36.3–47.1)
HEMOGLOBIN: 10 G/DL (ref 11.9–15.1)
MCH RBC QN AUTO: 30.8 PG (ref 25.2–33.5)
MCHC RBC AUTO-ENTMCNC: 31.6 G/DL (ref 28.4–34.8)
MCV RBC AUTO: 97.2 FL (ref 82.6–102.9)
NRBC AUTOMATED: 0 PER 100 WBC
PDW BLD-RTO: 13.6 % (ref 11.8–14.4)
PLATELET # BLD: 237 K/UL (ref 138–453)
PMV BLD AUTO: 10 FL (ref 8.1–13.5)
POTASSIUM SERPL-SCNC: 4.2 MMOL/L (ref 3.7–5.3)
RBC # BLD: 3.25 M/UL (ref 3.95–5.11)
SARS-COV-2, RAPID: NOT DETECTED
SODIUM BLD-SCNC: 133 MMOL/L (ref 135–144)
SPECIMEN DESCRIPTION: NORMAL
WBC # BLD: 14.2 K/UL (ref 3.5–11.3)

## 2021-04-28 PROCEDURE — 85027 COMPLETE CBC AUTOMATED: CPT

## 2021-04-28 PROCEDURE — 97530 THERAPEUTIC ACTIVITIES: CPT

## 2021-04-28 PROCEDURE — 87635 SARS-COV-2 COVID-19 AMP PRB: CPT

## 2021-04-28 PROCEDURE — 80048 BASIC METABOLIC PNL TOTAL CA: CPT

## 2021-04-28 PROCEDURE — 82947 ASSAY GLUCOSE BLOOD QUANT: CPT

## 2021-04-28 PROCEDURE — 36415 COLL VENOUS BLD VENIPUNCTURE: CPT

## 2021-04-28 PROCEDURE — 97535 SELF CARE MNGMENT TRAINING: CPT

## 2021-04-28 PROCEDURE — 6370000000 HC RX 637 (ALT 250 FOR IP): Performed by: INTERNAL MEDICINE

## 2021-04-28 PROCEDURE — 97110 THERAPEUTIC EXERCISES: CPT

## 2021-04-28 PROCEDURE — 6370000000 HC RX 637 (ALT 250 FOR IP): Performed by: ORTHOPAEDIC SURGERY

## 2021-04-28 RX ADMIN — INSULIN GLARGINE 25 UNITS: 100 INJECTION, SOLUTION SUBCUTANEOUS at 08:37

## 2021-04-28 RX ADMIN — OXYCODONE AND ACETAMINOPHEN 2 TABLET: 5; 325 TABLET ORAL at 05:00

## 2021-04-28 RX ADMIN — DOCUSATE SODIUM 50MG AND SENNOSIDES 8.6MG 1 TABLET: 8.6; 5 TABLET, FILM COATED ORAL at 08:37

## 2021-04-28 RX ADMIN — GABAPENTIN 100 MG: 100 CAPSULE ORAL at 08:36

## 2021-04-28 RX ADMIN — INSULIN LISPRO 4 UNITS: 100 INJECTION, SOLUTION INTRAVENOUS; SUBCUTANEOUS at 08:38

## 2021-04-28 RX ADMIN — LOSARTAN POTASSIUM 50 MG: 50 TABLET, FILM COATED ORAL at 08:36

## 2021-04-28 RX ADMIN — MORPHINE SULFATE 15 MG: 15 TABLET, FILM COATED, EXTENDED RELEASE ORAL at 08:36

## 2021-04-28 RX ADMIN — FUROSEMIDE 40 MG: 40 TABLET ORAL at 08:36

## 2021-04-28 RX ADMIN — MAGNESIUM GLUCONATE 500 MG ORAL TABLET 400 MG: 500 TABLET ORAL at 08:37

## 2021-04-28 RX ADMIN — GABAPENTIN 100 MG: 100 CAPSULE ORAL at 13:28

## 2021-04-28 RX ADMIN — TIZANIDINE 4 MG: 4 TABLET ORAL at 05:01

## 2021-04-28 RX ADMIN — OXYCODONE AND ACETAMINOPHEN 2 TABLET: 5; 325 TABLET ORAL at 11:48

## 2021-04-28 RX ADMIN — CARVEDILOL 3.12 MG: 3.12 TABLET, FILM COATED ORAL at 08:36

## 2021-04-28 RX ADMIN — INSULIN LISPRO 6 UNITS: 100 INJECTION, SOLUTION INTRAVENOUS; SUBCUTANEOUS at 12:24

## 2021-04-28 RX ADMIN — POLYETHYLENE GLYCOL 3350 17 G: 17 POWDER, FOR SOLUTION ORAL at 08:39

## 2021-04-28 ASSESSMENT — PAIN DESCRIPTION - LOCATION
LOCATION: BACK
LOCATION: BACK

## 2021-04-28 ASSESSMENT — PAIN SCALES - GENERAL
PAINLEVEL_OUTOF10: 9
PAINLEVEL_OUTOF10: 8
PAINLEVEL_OUTOF10: 9

## 2021-04-28 ASSESSMENT — PAIN DESCRIPTION - PAIN TYPE: TYPE: SURGICAL PAIN

## 2021-04-28 NOTE — DISCHARGE INSTR - COC
of insulin ([facility] S19.8301, Z79.4    Essential hypertension I10    Diabetic retinopathy ([facility] E11.319    Postoperative anemia due to acute blood loss D62       Isolation/Infection:   Isolation            No Isolation          Patient Infection Status       Infection Onset Added Last Indicated Last Indicated By Review Planned Expiration Resolved Resolved By    None active    Resolved    COVID-19 Rule Out 04/22/21 04/22/21 04/22/21 COVID-19 (Ordered)   04/23/21 Rule-Out Test Resulted            Nurse Assessment:  Last Vital Signs: BP (!) 140/55   Pulse 95   Temp 98.1 °F (36.7 °C) (Oral)   Resp 16   Ht 5' (1.524 m)   Wt 180 lb (81.6 kg)   LMP 04/09/2016   SpO2 100%   BMI 35.15 kg/m²     Last documented pain score (0-10 scale): Pain Level: 7  Last Weight:   Wt Readings from Last 1 Encounters:   04/26/21 180 lb (81.6 kg)     Mental Status:  oriented and alert    IV Access:  - None    Nursing Mobility/ADLs:  Walking   Dependent  Transfer  Assisted  Bathing  Assisted  Dressing  Assisted  Toileting  Assisted  Feeding  Independent  Med Admin  Independent  Med Delivery   whole    Wound Care Documentation and Therapy:        Elimination:  Continence:   · Bowel: Yes  · Bladder: Yes  Urinary Catheter: None   Colostomy/Ileostomy/Ileal Conduit: No       Date of Last BM: ***    Intake/Output Summary (Last 24 hours) at 4/27/2021 2153  Last data filed at 4/27/2021 1833  Gross per 24 hour   Intake 2494.5 ml   Output 500 ml   Net 1994.5 ml     I/O last 3 completed shifts: In: 2088.5 [P.O.:100; I.V.:1888.5; IV Piggyback:100]  Out: 620 [Urine:620]    Safety Concerns:      At Risk for Falls    Impairments/Disabilities:      None    Nutrition Therapy:  Current Nutrition Therapy:   - Oral Diet:  Carb Control 4 carbs/meal (1800kcals/day)    Routes of Feeding: Oral  Liquids: No Restrictions  Daily Fluid Restriction: no  Last Modified Barium Swallow with Video (Video Swallowing Test): not done    Treatments at the Time of Hospital Discharge:   Respiratory Treatments: ***  Oxygen Therapy:  is not on home oxygen therapy. Ventilator:    - No ventilator support    Rehab Therapies: Physical Therapy and Occupational Therapy  Weight Bearing Status/Restrictions: No weight bearing restirctions  Other Medical Equipment (for information only, NOT a DME order):  walker and bath bench  Other Treatments: Nursing assessments, medication administration/education    Patient's personal belongings (please select all that are sent with patient):  Glasses    RN SIGNATURE:  Electronically signed by Frankey Blush, RN on 4/28/21 at 2:28 PM EDT    CASE MANAGEMENT/SOCIAL WORK SECTION    Inpatient Status Date: ***    Readmission Risk Assessment Score:  Readmission Risk              Risk of Unplanned Readmission:        8           Discharging to Facility/ Agency   Name: [address], [address]  Phone  [phone]  · Fax  [phone]  · Address:  · Phone:  · Fax:    Dialysis Facility (if applicable)   · Name:  · Address:  · Dialysis Schedule:  · Phone:  · Fax:    / signature: Electronically signed by LEONARDO Poole on 4/28/21 at 12:41 PM EDT    PHYSICIAN SECTION    Prognosis: Good    Condition at Discharge: Stable    Rehab Potential (if transferring to Rehab): Good    Recommended Labs or Other Treatments After Discharge:     Physician Certification: I certify the above information and transfer of Laura Alfaro  is necessary for the continuing treatment of the diagnosis listed and that she requires [place] for less 30 days.      Update Admission H&P: No change in H&P    PHYSICIAN SIGNATURE:  Electronically signed by Roslyn Rueda MD on 4/27/21 at 9:54 PM EDT

## 2021-04-28 NOTE — CARE COORDINATION
Social Work-Met with patient to discuss dc options. Discussed therapy recommendation of SNF. The Plan for Transition of Care is related to the following treatment goals: SNF with PT/OT and skilled nursing. The Patient and/or patient representative patietn was provided with a choice of provider and agrees   with the discharge plan. [x] Yes [] No    Freedom of choice list was provided with basic dialogue that supports the patient's individualized plan of care/goals, treatment preferences and shares the quality data associated with the providers. [x] Yes [] No. Patient is reviewing list of SNF.  Aniya Brown
Social Work-Met with patient. She would like referral sent to [facility], [address] [place] [address] of [place], [place] of [facility], and [place] [facility]. Referrals faxed.  Aniya Brown
Social Work-[facility] received precert. [facility]. Discussed with patient. She is agreeable. Orders faxed. Nurse to call report to [phone]. HENS completed.  He Ramsey
therapy after her post op visit. PLAN  Home with daughter and assist from mom. Denies need for any DME  Home with no services.         Electronically signed by Ashleigh Kang on 4/27/21 at 9:04 AM EDT

## 2021-04-28 NOTE — PLAN OF CARE
Problem: Pain:  Goal: Pain level will decrease  Description: Pain level will decrease  4/28/2021 1237 by Pradip Narayanan RN  Outcome: Ongoing     Problem: Pain:  Goal: Control of acute pain  Description: Control of acute pain  4/28/2021 1237 by Pradip Narayanan RN  Outcome: Ongoing     Problem: Pain:  Goal: Control of chronic pain  Description: Control of chronic pain  Outcome: Ongoing     Problem: Skin Integrity:  Goal: Will show no infection signs and symptoms  Description: Will show no infection signs and symptoms  4/28/2021 1237 by Pradip Narayanan RN  Outcome: Ongoing     Problem: Skin Integrity:  Goal: Absence of new skin breakdown  Description: Absence of new skin breakdown  Outcome: Ongoing     Problem: Falls - Risk of:  Goal: Will remain free from falls  Description: Will remain free from falls  4/28/2021 1237 by Pradip Narayanan RN  Outcome: Ongoing     Problem: Falls - Risk of:  Goal: Absence of physical injury  Description: Absence of physical injury  Outcome: Ongoing     Problem: SAFETY  Goal: Free from accidental physical injury  Outcome: Ongoing     Problem: SAFETY  Goal: Free from intentional harm  Outcome: Ongoing     Problem: Infection - Surgical Site:  Goal: Will show no infection signs and symptoms  Description: Will show no infection signs and symptoms  4/28/2021 1237 by Pradip Narayanan RN  Outcome: Ongoing

## 2021-04-28 NOTE — PROGRESS NOTES
[place] [facility] [facility] [place] patient belongings sent with patient also discharge instructions and scripts given to transport, patient transported via [id number] [facility].

## 2021-04-28 NOTE — PROGRESS NOTES
Occupational Therapy  Facility/Department: [facility]  Daily Treatment Note  NAME: Julien Oswald  : 1960  MRN: [id number]    Date of Service: 2021    Discharge Recommendations:  Patient would benefit from continued therapy after discharge   Pt is currently functional below baseline and would suggest intense therapy post acute care. Would expect patient to be able to tolerate 3 hours of therapy per day. Please refer to AM-PAC score for current mobility/adl level. Assessment   Performance deficits / Impairments: Decreased functional mobility ; Decreased ADL status; Decreased strength;Decreased safe awareness;Decreased posture;Decreased endurance;Decreased balance  Prognosis: Good  OT Education: Home Exercise Program;Precautions; Energy Conservation;Transfer Training  Patient Education: Pt issued educational handouts on lumbar spondylolisthesis/stenosis, spinal prec, log roll tech, low back pain, EC and simple B UE AROM HEP. REQUIRES OT FOLLOW UP: Yes  Activity Tolerance  Activity Tolerance: Patient limited by pain  Safety Devices  Safety Devices in place: Yes  Type of devices: All fall risk precautions in place; Left in bed;Bed alarm in place;Nurse notified;Gait belt;Call light within reach; Patient at risk for falls         Patient Diagnosis(es): The primary encounter diagnosis was Spondylolisthesis, acquired. A diagnosis of Chronic bilateral low back pain with left-sided sciatica was also pertinent to this visit. has a past medical history of Arthritis, Diabetes mellitus ([address] Diabetic retinopathy ([address] GERD (gastroesophageal reflux disease), Hypertension, and Polymyalgia ([address] has a past surgical history that includes  section; Colonoscopy; Endoscopy, colon, diagnostic; eye surgery; Finger trigger release; Cardiac surgery; and Lumbar spine surgery (Left, 2021).     Restrictions  Restrictions/Precautions  Restrictions/Precautions: General Precautions, Fall Risk, Up as Tolerated  Required Braces or Orthoses?: Yes  Required Braces or Orthoses  Spinal: Lumbar Corset  Position Activity Restriction  Spinal Precautions: No Bending, No Lifting, No Twisting  Other position/activity restrictions: ambulate, activity as tolerated, pt to wear brace, telemetry, heels off bed at all times, turn/assist Q 2 hrs if unable to turn self  Subjective   General  Chart Reviewed: Yes  Patient assessed for rehabilitation services?: Yes  Response to previous treatment: Patient with no complaints from previous session  Family / Caregiver Present: No      Orientation  Orientation  Overall Orientation Status: Within Normal Limits  Objective             Balance  Sitting Balance: Stand by assistance  Standing Balance: Maximum assistance(x2)  Standing Balance  Time: less than 30 sec  Activity: standing with walker  Functional Mobility  Assist Level: Dependent/Total  Functional Mobility Comments: Andressa augustin used  Bed mobility  Sit to Supine: Maximum assistance;2 Person assistance  Scooting: Maximal assistance;2 Person assistance  Comment: Max verbal/tactile assist for log roll technique and overall safety with use of bed rails. Max A for rolling and for bridging. Transfers  Stand Step Transfers: Dependent/Total  Stand Pivot Transfers: Dependent/Total  Sit to stand: 2 Person assistance;Maximum assistance  Stand to sit: 2 Person assistance;Maximum assistance  Transfer Comments: Pt req'd use of christiano stedy to pull self up to standing with MAX A x2, uanble to achieve full stand with walker. Writer turned laxmi augustin backwards and placed RW in between pt and christianomargo augustin so once standing pt could put B UEs on walker. Pt has difficulty extending B knees and standing upright due to 9/10 back pain. Andressa augustin used to transfer to bed. Cognition  Overall Cognitive Status: Exceptions  Arousal/Alertness: Appropriate responses to stimuli  Following Commands:  Follows all commands without difficulty  Attention Span: Appears intact  Memory: Appears intact  Safety Judgement: Decreased awareness of need for assistance;Decreased awareness of need for safety  Problem Solving: Decreased awareness of errors;Assistance required to identify errors made;Assistance required to correct errors made  Insights: Decreased awareness of deficits  Initiation: Requires cues for some  Sequencing: Requires cues for some     Perception  Overall Perceptual Status: [facility]                                   Plan   Plan  Times per week: 4-5x/week, 1-2x/day  Current Treatment Recommendations: Strengthening, Balance Training, Functional Mobility Training, Endurance Training, Safety Education & Training, Self-Care / ADL, Pain Management, Positioning, Patient/Caregiver Education & Training, Equipment Evaluation, Education, & procurement                                   AM-PAC Score        AM-PAC Inpatient Daily Activity Raw Score: 14 (04/28/21 [facility])  AM-PAC Inpatient ADL T-Scale Score : 33.39 (04/28/21 [facility])  ADL Inpatient CMS 0-100% Score: 59.67 (04/28/21 [facility])  ADL Inpatient CMS G-Code Modifier : CK (04/28/21 [facility])    Goals  Short term goals  Time Frame for Short term goals: by discharge, pt will  Short term goal 1: demo CGA with ADL transfers with good safety and AD/DME as approp  Short term goal 2: demo CGA with functional mob with approp AD for ADL completion with good safety/pacing  Short term goal 3: demo and verb good understanding of ed provided on fall prevention techs, EC/WS techs, equip needs, back precautions, and ADL compensatory techs  Short term goal 4: demo CGA with toileting routine with good safety  Short term goal 5: demo min A with UB/LB ADLs with DME/AE as needed and good safety/pacing with good understanding of precautions  Patient Goals   Patient goals : to have pain controlled       Therapy Time   Individual Concurrent Group Co-treatment   Time In 0900         Time Out 0953         Minutes 53          Co

## 2021-05-09 NOTE — DISCHARGE SUMMARY
Physician Discharge Summary     Patient ID:  Joao Gil  [id number]  37 y.o.  1960    Admit date: 4/26/2021    Discharge date and time: 4/28/2021  3:08 PM     Admitting Physician: Alton Schaumann, MD     Discharge Physician: Alton Schaumann, MD    Admission Diagnoses: Spondylolisthesis, acquired [M43.10]    Discharge Diagnoses: Spondylolisthesis, acquired [facility] Course: admitted for L3-4 MIS TLIF/PSFI. Tolerated well but was not mobilizing well and SNF was recommended. No complications.     Consults:  medicine    Treatments: PT/OT    Disposition: SNF    Patient Instructions:   Meds: see list  Activity: WBAT  Diet: regular  Wound Care: dry dressing daily x 1wk    Follow-up 2 weeks  Signed:  Karyle Francois, MD  5/8/2021  10:14 PM    sennosides-docusate sodium 8.6-50 MG tablet  Commonly known as: SENOKOT-S  Take 1 tablet by mouth 2 times daily                  tiZANidine 4 MG tablet  Commonly known as: ZANAFLEX  Take 1 tablet by mouth every 8 hours as needed (spasm)  Replaces: tiZANidine 2 MG capsule                  These are medications you told us you were taking at home, CONTINUE taking them after you leave the hospital    These are medications you told us you were taking at home, CONTINUE taking them after you leave the hospital    Recent Dose Next Dose Due AM NOON PM NIGHT   calcium carbonate 500 MG Tabs tablet  Commonly known as: OSCAL  Take 500 mg by mouth daily                  carvedilol 3.125 MG tablet  Commonly known as: COREG  1 tablet with food                  FLAX SEEDS PO  Take by mouth                  furosemide 40 MG tablet  Commonly known as: LASIX  Take 40 mg by mouth 2 times daily                  gabapentin 100 MG capsule  Commonly known as: NEURONTIN  1 capsule                  HumaLOG 100 UNIT/ML injection cartridge  Generic drug: insulin lispro  Inject 6 Units into the skin as needed (sliding scale will vary on dose taken) Sliding scale                  Lantus 100 UNIT/ML

## 2021-11-29 NOTE — PROGRESS NOTES
Small raised red bumps noted underneath bilateral breast creases. States uses antifungal powder to area. Syncope, pharyngitis, headache and fatigue are patients complaints.  EKG, labs, urine and IVFs/pain control planned.

## 2022-09-17 ENCOUNTER — HOSPITAL ENCOUNTER (OUTPATIENT)
Dept: MAMMOGRAPHY | Age: 62
Discharge: HOME | End: 2022-09-19
Payer: COMMERCIAL

## 2022-09-17 DIAGNOSIS — Z13.820 ENCOUNTER FOR SCREENING FOR OSTEOPOROSIS: ICD-10-CM

## 2022-09-17 DIAGNOSIS — Z12.31 VISIT FOR SCREENING MAMMOGRAM: ICD-10-CM

## 2022-09-17 PROCEDURE — 77080 DXA BONE DENSITY AXIAL: CPT

## 2022-09-17 PROCEDURE — 77063 BREAST TOMOSYNTHESIS BI: CPT

## 2024-03-27 ENCOUNTER — TRANSCRIBE ORDERS (OUTPATIENT)
Dept: ADMINISTRATIVE | Age: 64
End: 2024-03-27

## 2024-03-27 DIAGNOSIS — Z78.0 POSTMENOPAUSAL: Primary | ICD-10-CM

## 2024-03-27 DIAGNOSIS — Z12.0 ENCOUNTER FOR SCREENING FOR MALIGNANT NEOPLASM OF STOMACH: ICD-10-CM

## 2024-04-18 ENCOUNTER — HOSPITAL ENCOUNTER (OUTPATIENT)
Dept: MAMMOGRAPHY | Age: 64
Discharge: HOME | End: 2024-04-20
Payer: COMMERCIAL

## 2024-04-18 DIAGNOSIS — Z12.0 ENCOUNTER FOR SCREENING FOR MALIGNANT NEOPLASM OF STOMACH: ICD-10-CM

## 2024-04-18 DIAGNOSIS — Z78.0 POSTMENOPAUSAL: ICD-10-CM

## 2024-04-18 DIAGNOSIS — Z12.31 ENCOUNTER FOR SCREENING MAMMOGRAM FOR MALIGNANT NEOPLASM OF BREAST: ICD-10-CM

## 2024-04-18 PROCEDURE — 77063 BREAST TOMOSYNTHESIS BI: CPT

## (undated) DEVICE — 1010 S-DRAPE TOWEL DRAPE 10/BX: Brand: STERI-DRAPE™

## (undated) DEVICE — ADHESIVE SKIN CLSR 0.7ML TOP DERMBND ADV

## (undated) DEVICE — NEEDLE NRV STIM BVL TIP INSUL PEDCL ACCS SYS FOR EMG MON

## (undated) DEVICE — SUTURE VCRL SZ 2-0 L36IN ABSRB UD L36MM CT-1 1/2 CIR J945H

## (undated) DEVICE — Device

## (undated) DEVICE — COVER,TABLE,HEAVY DUTY,50"X90",STRL: Brand: MEDLINE

## (undated) DEVICE — TUBING, SUCTION, 1/4" X 12', STRAIGHT: Brand: MEDLINE

## (undated) DEVICE — BLADE ES L6IN ELASTOMERIC COAT EXT DURABLE BEND UPTO 90DEG

## (undated) DEVICE — CODMAN® SURGICAL PATTIES 1" X 1" (2.54CM X 2.54CM): Brand: CODMAN®

## (undated) DEVICE — SUTURE VCRL SZ 0 L36IN ABSRB UD L36MM CT-1 1/2 CIR J946H

## (undated) DEVICE — APPLICATOR MEDICATED 26 CC SOLUTION HI LT ORNG CHLORAPREP

## (undated) DEVICE — GARMENT,MEDLINE,DVT,INT,CALF,MED, GEN2: Brand: MEDLINE

## (undated) DEVICE — K WIRE FIX NIT BVL BLNT TIP PRECEPT
Type: IMPLANTABLE DEVICE | Site: BACK | Status: NON-FUNCTIONAL
Removed: 2021-04-26

## (undated) DEVICE — CABLE FIX TRANSFORAMINAL LUM INTBDY FUS LT SELF RET MAS
Type: IMPLANTABLE DEVICE | Site: BACK | Status: NON-FUNCTIONAL
Removed: 2021-04-26

## (undated) DEVICE — TOTAL TRAY, DB, 100% SILI FOLEY, 16FR 10: Brand: MEDLINE

## (undated) DEVICE — YANKAUER,FLEXIBLE HANDLE,REGLR CAPACITY: Brand: MEDLINE INDUSTRIES, INC.

## (undated) DEVICE — DRAPE EQUIP XR 12 IN C ARM SET OEC

## (undated) DEVICE — SUTURE MCRYL SZ 4-0 L27IN ABSRB UD L19MM PS-2 1/2 CIR PRIM Y426H

## (undated) DEVICE — GLOVE SURG SZ 85 CRM LTX FREE POLYISOPRENE POLYMER BEAD ANTI

## (undated) DEVICE — INSERT CUSHION HEAD PRONEVIEW

## (undated) DEVICE — CORD,CAUTERY,BIPOLAR,STERILE: Brand: MEDLINE

## (undated) DEVICE — 4.0MM PRECISION ROUND

## (undated) DEVICE — RETRACTOR SURG MAS TLIF HOOP SHIM DISP MAXCESS

## (undated) DEVICE — TRAP,MUCUS SPECIMEN, 80CC: Brand: MEDLINE

## (undated) DEVICE — JCKSON TBL POSTNER NO HD REST: Brand: MEDLINE INDUSTRIES, INC.

## (undated) DEVICE — GAUZE, BORDER, 3"X6", 1.5"X4"PAD, STERIL: Brand: MEDLINE INDUSTRIES, INC.

## (undated) DEVICE — SPONGE LAP W18XL18IN WHT COT 4 PLY FLD STRUNG RADPQ DISP ST

## (undated) DEVICE — BLANKET WRM W29.9XL79.1IN UP BODY FORC AIR MISTRAL-AIR

## (undated) DEVICE — THE MILL DISPOSABLE - MEDIUM